# Patient Record
Sex: FEMALE
[De-identification: names, ages, dates, MRNs, and addresses within clinical notes are randomized per-mention and may not be internally consistent; named-entity substitution may affect disease eponyms.]

---

## 2018-11-09 ENCOUNTER — HOSPITAL ENCOUNTER (INPATIENT)
Dept: HOSPITAL 42 - ED | Age: 83
LOS: 11 days | Discharge: HOME | DRG: 675 | End: 2018-11-20
Attending: INTERNAL MEDICINE | Admitting: INTERNAL MEDICINE
Payer: MEDICARE

## 2018-11-09 VITALS — BODY MASS INDEX: 33 KG/M2

## 2018-11-09 DIAGNOSIS — I25.10: ICD-10-CM

## 2018-11-09 DIAGNOSIS — Z90.710: ICD-10-CM

## 2018-11-09 DIAGNOSIS — I70.1: Primary | ICD-10-CM

## 2018-11-09 DIAGNOSIS — Z87.891: ICD-10-CM

## 2018-11-09 DIAGNOSIS — E78.5: ICD-10-CM

## 2018-11-09 DIAGNOSIS — I08.0: ICD-10-CM

## 2018-11-09 DIAGNOSIS — I15.0: ICD-10-CM

## 2018-11-09 DIAGNOSIS — Z79.899: ICD-10-CM

## 2018-11-09 DIAGNOSIS — I16.0: ICD-10-CM

## 2018-11-09 DIAGNOSIS — Z85.42: ICD-10-CM

## 2018-11-09 DIAGNOSIS — Z79.82: ICD-10-CM

## 2018-11-09 DIAGNOSIS — K59.00: ICD-10-CM

## 2018-11-09 LAB
ALBUMIN SERPL-MCNC: 4.7 G/DL (ref 3–4.8)
ALBUMIN/GLOB SERPL: 1.4 {RATIO} (ref 1.1–1.8)
ALT SERPL-CCNC: 19 U/L (ref 7–56)
AST SERPL-CCNC: 29 U/L (ref 14–36)
BASOPHILS # BLD AUTO: 0.02 K/MM3 (ref 0–2)
BASOPHILS NFR BLD: 0.2 % (ref 0–3)
BUN SERPL-MCNC: 24 MG/DL (ref 7–21)
CALCIUM SERPL-MCNC: 10.3 MG/DL (ref 8.4–10.5)
EOSINOPHIL # BLD: 0.3 10*3/UL (ref 0–0.7)
EOSINOPHIL NFR BLD: 2.6 % (ref 1.5–5)
ERYTHROCYTE [DISTWIDTH] IN BLOOD BY AUTOMATED COUNT: 13.5 % (ref 11.5–14.5)
GFR NON-AFRICAN AMERICAN: 53
GRANULOCYTES # BLD: 6.8 10*3/UL (ref 1.4–6.5)
GRANULOCYTES NFR BLD: 56 % (ref 50–68)
HGB BLD-MCNC: 13.1 G/DL (ref 12–16)
LYMPHOCYTES # BLD: 4.2 10*3/UL (ref 1.2–3.4)
LYMPHOCYTES NFR BLD AUTO: 34.3 % (ref 22–35)
MCH RBC QN AUTO: 29.7 PG (ref 25–35)
MCHC RBC AUTO-ENTMCNC: 31.7 G/DL (ref 31–37)
MCV RBC AUTO: 93.7 FL (ref 80–105)
MONOCYTES # BLD AUTO: 0.8 10*3/UL (ref 0.1–0.6)
MONOCYTES NFR BLD: 6.9 % (ref 1–6)
PLATELET # BLD: 251 10^3/UL (ref 120–450)
PMV BLD AUTO: 9.2 FL (ref 7–11)
RBC # BLD AUTO: 4.41 10^6/UL (ref 3.5–6.1)
TROPONIN I SERPL-MCNC: < 0.01 NG/ML
WBC # BLD AUTO: 12.1 10^3/UL (ref 4.5–11)

## 2018-11-09 NOTE — RAD
Date of service: 



11/09/2018



HISTORY:

 palpitations 



COMPARISON:

No prior. 



FINDINGS:



LUNGS:

No active pulmonary disease.



PLEURA:

No significant pleural effusion identified, no pneumothorax apparent.



CARDIOVASCULAR:

 No radiographic findings to suggest acute or significant 

cardiovascular disease.



Atherosclerotic calcifications identified primarily aortic arch.



OSSEOUS STRUCTURES:

No significant abnormalities.



VISUALIZED UPPER ABDOMEN:

Normal.



OTHER FINDINGS:

None.



IMPRESSION:

No active disease. 



___________________________________________________________



Concordant results with the preliminary interpretation rendered by 

the emergency department physician

procedure.

## 2018-11-09 NOTE — ED PDOC
Arrival/HPI





- General


Chief Complaint: High Blood Pressure


Time Seen by Provider: 11/09/18 16:31


Historian: Patient





- History of Present Illness


Narrative History of Present Illness (Text): 





11/09/18 16:40


84 yo female with h/o HTN, Uterine CA presents to the ED c/o palpitations since 

drinking coffee at 1:30 pm today. States that she feels her heart racing. No 

chest pain, no trouble breathing, no headache or dizziness. Patient measured her

bp at home which 220 systolic prompting her to come to emergency room. Patient 

denies any fever, chills,  shortness of breath, diarrhea, nausea, vomiting,  

urinary symptoms, back pain, neck pain, or any other complaints. 





PMD: Dr. Carrasquillo


Cardiologist: Dr. Thao





Time/Duration: Other (earlier today)


Symptom Onset: Gradual


Symptom Course: Unchanged


Activities at Onset: Light


Context: Home





Past Medical History





- Provider Review


Nursing Documentation Reviewed: Yes





- Infectious Disease


Hx of Infectious Diseases: None





- Cardiac


Hx Hypertension: Yes


Hx Pacemaker: No





- Pulmonary


Hx Bronchitis: Yes





- Neurological


Hx Paralysis: No





- HEENT


Hx Cataracts: Yes (OU)





- Endocrine/Metabolic


Other/Comment: hypoglycemic





- Hematological/Oncological


Hx Blood Transfusions: No


Hx Blood Transfusion Reaction: No





- Musculoskeletal/Rheumatological


Hx Musculoskeletal Disorders: Yes





- Genitourinary/Gynecological


Hx Uterine Cancer: Yes





- Psychiatric


Hx Substance Use: No





- Surgical History


Hx Hysterectomy: Yes


Hx Orthopedic Surgery: Yes


Hx Thyroidectomy: Yes (1/2)





- Anesthesia


Hx Anesthesia Reactions: No


Hx Malignant Hyperthermia: No





- Suicidal Assessment


Feels Threatened In Home Enviroment: No





Family/Social History





- Physician Review


Nursing Documentation Reviewed: Yes


Family/Social History: Unknown Family HX


Smoking Status: Former Smoker


Hx Alcohol Use: No


Hx Substance Use: No





Allergies/Home Meds


Allergies/Adverse Reactions: 


Allergies





codeine Allergy (Intermediate, Verified 01/04/17 10:11)


   NAUSEA/VOMITING/VERTIGO


moxifloxacin HCl [From Avelox] Adverse Reaction (Intermediate, Verified 01/04/17

10:11)


   DIFFICULTY WALKING/TREMORS








Home Medications: 


                                    Home Meds











 Medication  Instructions  Recorded  Confirmed


 


Isosorbide Mononitrate ER [Imdur 30 mg PO DAILY 08/14/12 08/14/18





ER]   


 


Verapamil [Verapamil HCl] 180 mg PO DAILY 08/14/12 08/14/18


 


Furosemide [Lasix] 40 mg PO DAILY 06/13/16 08/14/18


 


Valsartan [Diovan] 100 mg PO DAILY 06/13/16 08/14/18


 


Aspirin [Ecotrin] 81 mg PO DAILY 12/19/16 08/14/18


 


Cholecalciferol [Vitamin D] 5,000 unit PO DAILY 12/19/16 08/14/18


 


Ascorbic Acid [Vitamin C 500 mg 500 mg PO QID 08/14/18 





Tab]   


 


Doxycycline Monohydrate [Mondoxyne 100 mg PO BID 08/14/18 





Nl]   


 


Lactobacillus Combination No.8 2 tab PO BID 08/14/18 





[Adult Probiotic]   


 


Omega-3 Fatty Acids/Fish Oil [Fish 1 tab PO DAILY 08/14/18 





Oil 1,000 mg Softgel]   


 


Promethazine/Dextromethorphan 1 tsp PO QID 08/14/18 





[Promethazine-Dm Syrup]   














Review of Systems





- Physician Review


All systems were reviewed & negative as marked: Yes





- Review of Systems


Constitutional: absent: Fevers, Night Sweats


Respiratory: absent: SOB


Cardiovascular: Palpitations.  absent: Chest Pain


Gastrointestinal: absent: Diarrhea, Nausea, Vomiting


Genitourinary Female: absent: Urine Output Changes


Musculoskeletal: absent: Back Pain, Neck Pain


Neurological: absent: Headache, Dizziness





Physical Exam


Vital Signs Reviewed: Yes





Vital Signs











  Temp Pulse Resp BP Pulse Ox


 


 11/09/18 17:33   78  18  183/73 H  99


 


 11/09/18 17:18   106 H   217/78 H 


 


 11/09/18 17:16     217/78 H 


 


 11/09/18 17:07   72  18  230/104 H  98


 


 11/09/18 16:24  98.1 F  88  18  243/89 H  99











Temperature: Afebrile


Blood Pressure: Hypertensive


Pulse: Regular


Respiratory Rate: Normal


Appearance: Positive for: Well-Appearing, Non-Toxic, Comfortable


Pain Distress: None


Mental Status: Positive for: Alert and Oriented X 3





- Systems Exam


Head: Present: Atraumatic, Normocephalic


Pupils: Present: PERRL


Extroacular Muscles: Present: EOMI


Conjunctiva: Present: Normal


Mouth: Present: Moist Mucous Membranes


Neck: Present: Normal Range of Motion


Respiratory/Chest: Present: Clear to Auscultation, Good Air Exchange.  No: 

Respiratory Distress, Accessory Muscle Use


Cardiovascular: Present: Regular Rate and Rhythm, Normal S1, S2.  No: Murmurs


Abdomen: No: Tenderness, Distention, Peritoneal Signs


Back: Present: Normal Inspection


Upper Extremity: Present: Normal Inspection.  No: Cyanosis, Edema


Lower Extremity: Present: Swelling (+right lower leg swelling, no new swelling)


Neurological: Present: GCS=15, CN II-XII Intact, Speech Normal


Skin: Present: Warm, Dry, Normal Color.  No: Rashes


Psychiatric: Present: Alert, Oriented x 3, Normal Insight, Normal Concentration





Medical Decision Making


ED Course and Treatment: 





11/09/18 16:42


Impression: 85 year old female presenting to the emergency room complaining of 

palpitations. 





Plan:


-- EKG


-- Physician Consult: Dr. Thao


-- Chest X-ray 


-- Trandate


-- Reassess and disposition





Prior Visits:


Notes and results from previous visits were reviewed. 





Progress Notes:





11/09/18 17:00


EKG: Ordered, reviewed, and independently interpreted the EKG.


Rate : 66 BPM


Rhythm : NSR


Interpretation : Minimal voltage for LVH, no ST-segment elevations or 

depressions, no T-wave inversions, normal intervals.





11/09/18 18:45


Patient's BP has improved. She is on multiple blood pressure medications and 

needs better control of her BP. She still has palpitations which is making it a 

discomfort in her chest. Discussed case with Dr. Ram who will accept patient

to her service. Dr. Thao was placed on consult. 





- Lab Interpretations


Lab Results: 











                                 11/09/18 16:45 





                                 11/09/18 16:45 





                                   Lab Results





11/09/18 16:45: Sodium Pending, Potassium Pending, Chloride Pending, Carbon 

Dioxide Pending, Anion Gap Pending, BUN 24 H, Creatinine 1.0, Est GFR ( 

Amer) > 60, Est GFR (Non-Af Amer) 53, Random Glucose 93, Calcium 10.3, Magnesium

Pending, Total Bilirubin 0.6, AST Pending, ALT Pending, Alkaline Phosphatase 

Pending, Lactate Dehydrogenase Pending, Total Creatine Kinase 36, Troponin I 

Pending, Total Protein 8.0, Albumin 4.7, Globulin 3.4, Albumin/Globulin Ratio 

1.4


11/09/18 16:45: WBC 12.1 H, RBC 4.41, Hgb 13.1, Hct 41.3, MCV 93.7, MCH 29.7, 

MCHC 31.7, RDW 13.5, Plt Count 251, MPV 9.2, Gran % 56.0, Lymph % (Auto) 34.3, 

Mono % (Auto) 6.9 H, Eos % (Auto) 2.6, Baso % (Auto) 0.2, Gran # 6.80 H, Lymph #

(Auto) 4.2 H, Mono # (Auto) 0.8 H, Eos # (Auto) 0.3, Baso # (Auto) 0.02











- RAD Interpretation


Radiology Orders: 











11/09/18 17:08


CHEST PORTABLE [RAD] Stat 














- Medication Orders


Current Medication Orders: 














Discontinued Medications





Labetalol HCl (Trandate)  20 mg IV STAT STA


   Stop: 11/09/18 17:09


   Last Admin: 11/09/18 17:18  Dose: 20 mg





eMAR Start Stop


 Document     11/09/18 17:18  GMD  (Rec: 11/09/18 17:18  [facility]  [id number])


     Intravenous Solution


      Start Date                                 11/09/18


      Start Time                                 17:18


MAR Pulse and Blood Pressure


 Document     11/09/18 17:18  GMD  (Rec: 11/09/18 17:18  GMD  [id number])


     Pulse


      Pulse Rate (60-90)                         106


     Blood Pressure


      Blood Pressure (100//90)             217/78














- Scribe Statement


The provider has reviewed the documentation as recorded by the Junior Schmidt





All medical record entries made by the Junior were at my direction and 

personally dictated by me. I have reviewed the chart and agree that the record 

accurately reflects my personal performance of the history, physical exam, 

medical decision making, and the department course for this patient. I have also

 personally directed, reviewed, and agree with the discharge instructions and 

disposition.








Disposition/Present on Arrival





- Present on Arrival


Any Indicators Present on Arrival: No


History of DVT/PE: No


History of Uncontrolled Diabetes: No


Urinary Catheter: No


History of Decub. Ulcer: No


History Surgical Site Infection Following: None





- Disposition


Have Diagnosis and Disposition been Completed?: Yes


Diagnosis: 


 Hypertension, Palpitations





Disposition: HOSPITALIZED


Disposition Time: 18:48


Patient Plan: Observation


Condition: FAIR

## 2018-11-10 LAB
ALBUMIN SERPL-MCNC: 3.5 G/DL (ref 3–4.8)
ALBUMIN/GLOB SERPL: 1.2 {RATIO} (ref 1.1–1.8)
ALT SERPL-CCNC: 22 U/L (ref 7–56)
APPEARANCE UR: CLEAR
AST SERPL-CCNC: 22 U/L (ref 14–36)
BACTERIA #/AREA URNS HPF: (no result) /[HPF]
BASOPHILS # BLD AUTO: 0.01 K/MM3 (ref 0–2)
BASOPHILS NFR BLD: 0.1 % (ref 0–3)
BILIRUB UR-MCNC: NEGATIVE MG/DL
BUN SERPL-MCNC: 22 MG/DL (ref 7–21)
CALCIUM SERPL-MCNC: 9.4 MG/DL (ref 8.4–10.5)
COLOR UR: YELLOW
EOSINOPHIL # BLD: 0.2 10*3/UL (ref 0–0.7)
EOSINOPHIL NFR BLD: 2.9 % (ref 1.5–5)
EPI CELLS #/AREA URNS HPF: (no result) /HPF (ref 0–5)
ERYTHROCYTE [DISTWIDTH] IN BLOOD BY AUTOMATED COUNT: 13.5 % (ref 11.5–14.5)
GFR NON-AFRICAN AMERICAN: 47
GLUCOSE UR STRIP-MCNC: NEGATIVE MG/DL
GRANULOCYTES # BLD: 4.87 10*3/UL (ref 1.4–6.5)
GRANULOCYTES NFR BLD: 62 % (ref 50–68)
HGB BLD-MCNC: 11.3 G/DL (ref 12–16)
LEUKOCYTE ESTERASE UR-ACNC: NEGATIVE LEU/UL
LYMPHOCYTES # BLD: 2.1 10*3/UL (ref 1.2–3.4)
LYMPHOCYTES NFR BLD AUTO: 26.7 % (ref 22–35)
MCH RBC QN AUTO: 30.6 PG (ref 25–35)
MCHC RBC AUTO-ENTMCNC: 32.8 G/DL (ref 31–37)
MCV RBC AUTO: 93.2 FL (ref 80–105)
MONOCYTES # BLD AUTO: 0.7 10*3/UL (ref 0.1–0.6)
MONOCYTES NFR BLD: 8.3 % (ref 1–6)
PH UR STRIP: 6.5 [PH] (ref 4.7–8)
PLATELET # BLD: 204 10^3/UL (ref 120–450)
PMV BLD AUTO: 9.1 FL (ref 7–11)
PROT UR STRIP-MCNC: NEGATIVE MG/DL
RBC # BLD AUTO: 3.69 10^6/UL (ref 3.5–6.1)
RBC # UR STRIP: (no result) /UL
RBC #/AREA URNS HPF: (no result) /HPF (ref 0–2)
SP GR UR STRIP: 1.01 (ref 1–1.03)
T4 FREE SERPL-MCNC: 1.16 NG/DL (ref 0.78–2.19)
TROPONIN I SERPL-MCNC: < 0.01 NG/ML
URATE SERPL-MCNC: 6.7 MG/DL (ref 2.5–6.2)
UROBILINOGEN UR STRIP-ACNC: 0.2 E.U./DL
WBC # BLD AUTO: 7.9 10^3/UL (ref 4.5–11)
WBC #/AREA URNS HPF: NEGATIVE /HPF (ref 0–6)

## 2018-11-10 RX ADMIN — METOPROLOL SUCCINATE SCH MG: 50 TABLET, EXTENDED RELEASE ORAL at 21:48

## 2018-11-10 NOTE — HP
DATE OF EXAM:  11/09/2018





HISTORY OF PRESENT ILLNESS:  The patient is 85 years old who came to

emergency room because of not feeling well.  The patient states she has

feeling of palpitations _____ chest discomfort.  She had coffee in the late

afternoon.  After that, she felt that her heart is fluttering; did not have

any chest pain; did not have any shortness of breath.  She denies any

nausea or vomiting.  No history of dizziness.  The patient states she took

her blood pressure at home; it was 220 and she got worried, and so she came

to emergency room for further evaluation.



PAST MEDICAL HISTORY:  Significant for hypertension.  Recently, she was

having some cough and congestion, so she was given some antitussive.



ALLERGIES:  SHE IS ALLERGIC TO CODEINE AND AVELOX.



MEDICATIONS AT HOME:  She is on verapamil 180 daily and valsartan 160

daily.  She is on Lactobacillus a day, isosorbide mononitrate, and she is

on Lasix 40 daily, doxycycline 100 mg twice a day, and aspirin 81 daily.



SOCIAL HISTORY:  She used to be a heavy smoker in the past, but quit. 

Socially drinks.



PHYSICAL EXAMINATION

GENERAL:  She is awake, alert, oriented, able to communicate.

VITAL SIGNS:  She is afebrile, pulse 58, respirations 18, blood pressure

190/71.

LUNGS:  Bilateral fair airflow.  No rhonchi or crackle.

HEART:  S1 and S2 audible.

ABDOMEN:  Soft, nontender.  No rebound.  No guarding.

NEUROLOGICAL:  The patient is awake, alert, oriented, communicative.



LABORATORY EXAMINATION:  WBC is 12.1, hemoglobin 13.1, hematocrit 41.3,

platelets 251,000.  Chemistry:  Sodium 141, potassium 4.1, chloride 104,

CO2 of 27, BUN 24, creatinine 1, blood sugar of 93.  LFTs are within normal

limits.



ASSESSMENT:

1.  Hypertensive urgency.

2.  Uncontrolled hypertension.

3.  Hyperlipidemia.

4.  Leukocytosis, etiology unclear yet.

5.  History of _____.



PLAN:  The patient will be admitted on telemetry.  We will start her on

Norvasc 10 mg daily and put her on metoprolol and follow up her CBC, CMP,

lipid, thyroid profile in a.m.  Out of bed to chair.  We will put her on

heart-healthy diet.







__________________________________________

Pamela Ram MD





DD:  11/09/2018 19:22:42

DT:  11/10/2018 0:02:49

Job # [id number]

## 2018-11-10 NOTE — CON
DATE OF CONSULTATION:  11/10/2018



INDICATIONS:  Palpitations, heart racing, accelerated hypertension.



HISTORY OF PRESENT ILLNESS:  This is an 85-year-old woman who came to the

emergency room yesterday after inadvertently drinking caffeinated coffee with

palpitations, heart racing, and markedly elevated blood pressure with

systolic blood pressure in the 220-240 range.  She was admitted to

telemetry.  She has been given antihypertensive therapy.  Her blood

pressure has improved.  Her symptoms have improved.  There is no chest

pain, orthopnea, PND, syncope, presyncope, lightheadedness, dizziness,

vertigo, edema, claudication, fever, chills, cough, sputum production,

hemoptysis, abdominal pain, nausea, vomiting, diarrhea, constipation, or

melena.



PAST MEDICAL HISTORY:  Notable for hypertension and uterine cancer.  She

has had a remote cardiac catheterization, which was apparently normal in

1998.  There is no history of rheumatic fever, myocardial infarction,

angina, congestive heart failure, diabetes, stroke, TIA or gout.



MEDICATIONS:  Medications at the time of admission include aspirin,

losartan, Imdur, Lasix, verapamil, and vitamin D.



ALLERGIES:  SHE NOTES ALLERGIES TO AVELOX AND CODEINE.



SOCIAL HISTORY:  She lives at home.  She is ambulatory.  She does not smoke

cigarettes.  She does not drink alcohol.



FAMILY HISTORY:  Noncontributory.



REVIEW OF SYSTEMS:  A 10-point review of systems is otherwise unremarkable,

except as noted above.



PHYSICAL EXAMINATION:

GENERAL:  She is a well-developed woman, lying in bed, on telemetry, in no

acute distress.

VITAL SIGNS:  Notable for sinus rhythm to sinus bradycardia, 56-77 beats

per minute, afebrile, last blood pressure 161/65, respirations 18-20, O2

sat 95-97% on room air.

HEENT:  Exam reveals no neck vein distention, thyromegaly, carotid bruits. 

Mucous membranes moist.  Conjunctivae pink.

NECK:  Supple.

LUNGS:  Lung fields clear.

HEART:  Normal first and second heart sounds.

ABDOMEN:  Soft.  Bowel sounds present.  No mass, organomegaly, tenderness,

rebound or guarding.  No CVA tenderness.  No palpable abdominal aortic

aneurysm.

EXTREMITIES:  No cyanosis, clubbing or edema.

NEUROLOGIC:  Awake, alert, and oriented.

PSYCHIATRIC:  Normal as to mood and affect.

SKIN:  Warm and dry.  No rash or cellulitis.



LABORATORY AND IMAGING STUDIES:  EKG demonstrates regular sinus rhythm, LVH

by voltage, poor R-wave progression, nonspecific ST wave changes.  No

change from a prior EKG.  Chest x-ray reveals no active disease.  White

count 12,100, repeat 7900; hemoglobin 11.3; hematocrit 34.4; platelet count

204,000.  Electrolytes, BUN, creatinine, blood sugar, magnesium, LFTs, CK,

two troponins, thyroid tests all normal.



ASSESSMENT AND PLAN:  The patient is an 85-year-old woman with

hypertension, a former smoker, with a history of a negative cath in 1998,

admitted with palpitations after consuming caffeine inadvertently with

markedly elevated blood pressure.



At this time, she is being treated with antihypertensives.  She is getting

Norvasc, metoprolol, losartan, and p.r.n. clonidine.  She got a dose of

labetalol.  She is getting Lasix.  I will order an echocardiogram.  She can

be out of bed.  She should follow a no-added salt diet.  We will titrate

her antihypertensive medications.  Imdur can be discontinued.  Verapamil

can be discontinued replacing it with amlodipine.  I will follow along with you.


I will make additional recommendations based on her clinical course.





__________________________________________

Willie Thao MD





DD:  11/10/2018 8:00:51

DT:  11/10/2018 8:06:01

Job # [id number]



MTDD

## 2018-11-10 NOTE — CARD
--------------- APPROVED REPORT --------------





Date of service: 11/09/2018



EKG Measurement

Heart [id number]

VT 172P32

[id number]

[id number]

[id number]



<Conclusion>

Normal sinus rhythm

Minimal voltage criteria for LVH, may be normal variant

Anterolateral infarct, age undetermined

Abnormal ECG

## 2018-11-11 RX ADMIN — POTASSIUM CHLORIDE SCH MEQ: 20 TABLET, EXTENDED RELEASE ORAL at 08:33

## 2018-11-11 RX ADMIN — METOPROLOL SUCCINATE SCH MG: 50 TABLET, EXTENDED RELEASE ORAL at 22:24

## 2018-11-11 NOTE — CP.PCM.PN
Subjective





- Date & Time of Evaluation


Date of Evaluation: 11/11/18


Time of Evaluation: 07:00





- Subjective


Subjective: 


Stable on 3R She feels OK. BPs still high.





V/S not. RSR/S. Sonny.  180 sys this AM





PE:





Lungs: clear


Cor.: S1S2


Abd.: soft


Ext.: no edema


Neuro.: alert





Labs 11/10 noted.





Echo done: NL LV with LVH.  See report








Objective





- Vital Signs/Intake and Output


Vital Signs (last 24 hours): 


                                        











Temp Pulse Resp BP Pulse Ox


 


 97.9 F   50 L  18   181/68 H  96 


 


 11/11/18 06:00  11/11/18 06:00  11/11/18 06:00  11/11/18 06:00  11/11/18 06:00











- Medications


Medications: 


                               Current Medications





Amlodipine Besylate (Norvasc)  10 mg PO DAILY [facility]


   Last Admin: 11/10/18 09:51 Dose:  10 mg


Aspirin (Aspirin Chewable)  81 mg PO DAILY [facility]


   Last Admin: 11/10/18 09:52 Dose:  81 mg


Clonidine HCl (Catapres)  0.1 mg PO Q6H PRN


   PRN Reason: SBP>150


   Last Admin: 11/11/18 02:23 Dose:  0.1 mg


Furosemide (Lasix)  40 mg PO DAILY [facility]


   Last Admin: 11/10/18 09:52 Dose:  40 mg


Losartan Potassium (Cozaar)  100 mg PO DAILY [facility]


   Last Admin: 11/10/18 09:52 Dose:  100 mg


Metoprolol Succinate (Toprol Xl)  50 mg PO HS [facility]


   Last Admin: 11/10/18 21:48 Dose:  50 mg











- Labs


Labs: 


                                        





                                 11/10/18 06:00 





                                 11/10/18 06:00 











Assessment and Plan





- Assessment and Plan (Free Text)


Assessment: 





Palpitations and heart racing after caffeine


HBP


H/O remote normal cardiac cath.


Uterine cancer


Former Smoker





Plan:





Titrate B meds: Make clonidine 0.1 mg. TID


Continue: amlodipine, Lasix, losartan


OOB/Ambulate


PARDEEP diet

## 2018-11-11 NOTE — CARD
--------------- APPROVED REPORT --------------





Date of service: 11/10/2018



EXAM: Two-dimensional and M-mode echocardiogram with Doppler and 

color Doppler.



Other Information 

Quality : FairRhythm : 



INDICATION

ACC. HBP, PALPS



2D DIMENSIONS 

Left Atrium (2D)4.3   (1.6-4.0cm)IVSd1.3   (0.7-1.1cm)

LVDd4.2   (3.9-5.9cm)PWd1.3   (0.7-1.1cm)

LVDs2.7   (2.5-4.0cm)FS (%) 35.9   %

LVEF (%)65.0   (>50%)



M-Mode DIMENSIONS 

Aortic Root2.80   (2.2-3.7cm)Aortic Cusp Exc.1.90   (1.5-2.0cm)



Aortic Valve

AoV Peak Hieygvdv797.0cm/s



Mitral Valve

MV E Lopvrazx967.0cm/sMV A Awwkizit748.0cm/sE/A ratio0.8



TDI

Lateral E' Peak V4.58cm/sMedial E' Peak V4.87cm/sE/Lateral E'23.8

E/Medial E'22.4



Tricuspid Valve

TR Peak Vqmgtzjs091gc/sRAP GSGEAGUN33ixLcXI Peak Gr.32mmHg

[id number]



 LEFT VENTRICLE 

The left ventricle is normal size. There is mild concentric left 

ventricular hypertrophy. The left ventricular function is normal. The 

left ventricular ejection fraction is within the normal range. There 

is normal LV segmental wall motion.



 RIGHT VENTRICLE 

The right ventricle is normal size.



 ATRIA 

The left atrium is mildly dilated. The right atrium size is normal. 

The interatrial septum is intact with no evidence for an atrial 

septal defect.



 AORTIC VALVE 

The aortic valve is mildly calcified. There is mild aortic 

regurgitation. 



 MITRAL VALVE 

The mitral valve is thickened but opens well. Mitral annular 

calcification is mild to moderate. Mitral regurgitation is trace.



 TRICUSPID VALVE 

The tricuspid valve is normal in structure. There is mild tricuspid 

regurgitation. There is mild pulmonary hypertension.



 PULMONIC VALVE 

The pulmonic valve is not well visualized. There is trace pulmonic 

valvular regurgitation. 



 GREAT VESSELS 

The aortic root is normal in size.



 PERICARDIAL EFFUSION 

There is no pericardial effusion.



<Conclusion>

The left ventricle is normal size.

There is mild concentric left ventricular hypertrophy.

The left ventricular function is normal.

The aortic valve is mildly calcified. Aortic sclerosis.

There is mild aortic regurgitation. 

There is mild tricuspid regurgitation.

There is mild pulmonary hypertension.

## 2018-11-11 NOTE — PN
DATE:  11/11/2018



SUBJECTIVE:  The patient has no complaints of any chest pain.  No shortness

of breath.  She is sitting in a chair, comfortable.



PHYSICAL EXAMINATION:

VITAL SIGNS:  Temperature is 97.9, pulse of 55, blood pressure is 157/65,

respirations 18.

GENERAL:  The patient is lying in bed, flat, comfortable.

HEENT:  No oral lesion.  Anicteric sclerae.  Moist mucosa.

NECK:  No JVD, adenopathy, or thyromegaly.

CARDIOVASCULAR:  S1 and S2, regular.  No murmurs, rubs, or gallops.

LUNGS:  Clear to auscultation bilaterally.  No wheeze, rales, or rhonchi.

ABDOMEN:  Bowel sounds are positive, soft, nontender and nondistended.

EXTREMITIES:  No cyanosis, clubbing or edema.



LABORATORY DATA:  White count is 7.9, hemoglobin 11.3.  Creatinine is 1.1.



ASSESSMENT:

1.  _____.

2.  Uterine cancer.

3.  Hypertensive urgency.

4.  Dyslipidemia.



PLAN:  The patient's blood pressure is elevated today.  She was seen by Dr. Thao, he has added clonidine 3 times a day for her hypertension.  She is

on an aspirin daily.  She is going to continue with losartan and amlodipine

for her hypertension.  If her blood pressure butt not improve, she may need

hydrochlorothiazide.  The patient is on a heart-healthy diet.







__________________________________________

Dilip Joyce MD





DD:  11/11/2018 10:46:54

DT:  11/11/2018 14:04:19

Job # [id number]

## 2018-11-12 RX ADMIN — METOPROLOL SUCCINATE SCH: 50 TABLET, EXTENDED RELEASE ORAL at 21:26

## 2018-11-12 RX ADMIN — POLYETHYLENE GLYCOL 3350 SCH GM: 17 POWDER, FOR SOLUTION ORAL at 10:31

## 2018-11-12 RX ADMIN — BACITRACIN SCH APPLIC: 500 OINTMENT TOPICAL at 17:24

## 2018-11-12 RX ADMIN — POTASSIUM CHLORIDE SCH MEQ: 20 TABLET, EXTENDED RELEASE ORAL at 10:30

## 2018-11-12 NOTE — PN
DATE:  11/12/2018



SUBJECTIVE:  The patient is 85 years old, seen and examined sitting in

chair.  Denies any nausea or vomiting.  No history of dizziness.  She is

nervous going home because she still has fluctuating blood pressure.



PHYSICAL EXAMINATION:

VITAL SIGNS:  She is afebrile, pulse 54, respirations 19, blood pressure

159/58.

LUNGS:  Bilateral fair airflow.  No rhonchi or crackle.

HEART:  S1 and S2 audible.

ABDOMEN:  Soft, nontender.  No rebound.  No guarding.

NEUROLOGICAL:  The patient is awake, alert, oriented, communicative,

ambulatory.



ASSESSMENT:

1.  Uncontrolled hypertension.

2.  Palpitation.

3.  History of uterine cancer.

4.  Renal insufficiency.

5.  Hyperlipidemia.



PLAN:  The patient is currently on amlodipine 10 mg.  We will increase

clonidine 0.2 three times a day and metoprolol cannot be increased since

she is running bradycardic.  We will follow up the patient in the a.m.





__________________________________________

Pamela Ram MD



DD:  11/12/2018 21:51:38

DT:  11/12/2018 22:04:07

Job # [id number]

## 2018-11-12 NOTE — PN
DATE:  11/10/2018



SUBJECTIVE:  The patient is 85 years old, seen and examined, lying in bed. 

Seems to be comfortable.  She states she is just feeling some heat in her

face.  Otherwise, doing well.  No nausea or vomiting.  No diarrhea.



PHYSICAL EXAMINATION:

VITAL SIGNS:  She is afebrile, pulse 55, respirations 20, blood pressure

157/67.

LUNGS:  Bilateral fair airflow.  No rhonchi or crackle.

HEART:  S1 and S2 audible.

ABDOMEN:  Soft.  Nontender.  No rebound.  No guarding.

NEUROLOGICAL:  The patient is awake, alert, oriented, communicative.



LABORATORY EXAM:  WBC 7.9, hemoglobin 11.3, hematocrit 34, platelet 204. 

Chemistry:  Sodium 141, potassium 3.6, chloride 107, CO2 of 28, BUN 22,

creatinine 1.1, blood sugar _____.



ASSESSMENT:

1.  Uncontrolled hypertension.

2.  Status post palpitation.

3.  History of uterine cancer.

4.  History of multiple orthopedic surgeries in the past.



PLAN:  We will continue the patient on aspirin 81 daily.  She is on

clonidine as needed.  We will continue her on losartan.  She is on Lasix. 

We will continue her on amlodipine and metoprolol can be given at bedtime. 

We will monitor the patient's blood pressure for 24 hours.  If her blood

pressure is stable, possible discharge in the a.m.





__________________________________________

Pamela Ram MD





DD:  11/10/2018 11:32:58

DT:  11/10/2018 11:36:23

Job # [id number]

## 2018-11-12 NOTE — CP.PCM.PN
Subjective





- Date & Time of Evaluation


Date of Evaluation: 11/12/18


Time of Evaluation: 07:00





- Subjective


Subjective: 





Stable on 3R She feels OK. BPs better during the day but still elevated during 

the night and this AM. HR in 40's.





V/S not. RSR/S. Sonny.  180 sys this AM





PE:





Lungs: clear


Cor.: S1S2


Abd.: soft


Ext.: no edema


Neuro.: alert





I/O = 760/350 recorded.





Labs 11/10 noted.





Echo done: NL LV with LVH.  See report











Objective





- Vital Signs/Intake and Output


Vital Signs (last 24 hours): 


                                        











Temp Pulse Resp BP Pulse Ox


 


 97.9 F   43 L  18   186/70 H  98 


 


 11/12/18 06:00  11/12/18 06:00  11/12/18 06:00  11/12/18 06:00  11/12/18 06:00








Intake and Output: 


                                        











 11/12/18 11/12/18





 06:59 18:59


 


Intake Total 480 


 


Balance 480 














- Medications


Medications: 


                               Current Medications





Amlodipine Besylate (Norvasc)  10 mg PO DAILY [facility]


   Last Admin: 11/11/18 09:04 Dose:  10 mg


Aspirin (Aspirin Chewable)  81 mg PO DAILY [facility]


   Last Admin: 11/11/18 09:04 Dose:  81 mg


Chlorthalidone (Hygroton)  25 mg PO DAILY [facility]


Clonidine HCl (Catapres)  0.2 mg PO BID [facility]


Losartan Potassium (Cozaar)  100 mg PO DAILY [facility]


   Last Admin: 11/11/18 09:05 Dose:  100 mg


Metoprolol Succinate (Toprol Xl)  50 mg PO HS [facility]


   Last Admin: 11/11/18 22:24 Dose:  50 mg


Polyethylene Glycol (Miralax)  17 gm PO DAILY [facility]


Potassium Chloride (K-Dur 20 Meq Er Tab)  20 meq PO BRK [facility]


   Last Admin: 11/11/18 08:33 Dose:  20 meq











- Labs


Labs: 


                                        





                                 11/10/18 06:00 





                                 11/10/18 06:00 











Assessment and Plan





- Assessment and Plan (Free Text)


Assessment: 





Palpitations and heart racing after caffeine


HBP


H/O remote normal cardiac cath.


Uterine cancer


Former Smoker





Plan:





Titrate meds: Make clonidine 0.2 mg. BID


Change Lasix to Hygroton 25/day


Continue: amlodipine, losartan


OOB/Ambulate


PARDEEP diet


Home soon with close out-pt f/u.

## 2018-11-13 RX ADMIN — BACITRACIN SCH APPLIC: 500 OINTMENT TOPICAL at 18:02

## 2018-11-13 RX ADMIN — POLYETHYLENE GLYCOL 3350 SCH GM: 17 POWDER, FOR SOLUTION ORAL at 09:24

## 2018-11-13 RX ADMIN — METOPROLOL SUCCINATE SCH MG: 50 TABLET, EXTENDED RELEASE ORAL at 09:22

## 2018-11-13 RX ADMIN — BACITRACIN SCH APPLIC: 500 OINTMENT TOPICAL at 09:24

## 2018-11-13 RX ADMIN — POTASSIUM CHLORIDE SCH MEQ: 20 TABLET, EXTENDED RELEASE ORAL at 09:23

## 2018-11-13 NOTE — US
PROCEDURE:  Bilateral renal artery duplex ultrasound.



CLINICAL HISTORY:  Renal artery stenosis. Uncontrolled hypertension. 

Evaluate for renovascular hypertension.



PHYSICIAN(S):  Zia Gordon M.D.



TECHNIQUE:

Duplex sonography with color-flow Doppler was used to evaluate the 

visualized segments of the main renal arteries. The patient was 

evaluated in a fasting state. Imaging in a supine and decubitus 

position was performed. Limited evaluation of the arcuate waveforms 

and resistive indices were performed.



FINDINGS:

The exam is limited.  The kidneys are normal in size, shape, and 

location. The right kidney measures 9.9cm in length and the left 

kidney measures 9.0cm in length. No solid renal masses, abnormal 

calcifications, or hydronephrosis is seen. 



The right main renal artery is tortuous in seen in segment.  The peak 

systolic velocity in the right main renal artery is 425 cm/sec. This 

is consistent with a 50-99 percentstenosis in the main right renal 

artery. The arcuate waveforms are normal. The resistive index is 

normal.



The left main renal artery is also only visualized in segments..  The 

peak systolic velocity in the main left renal artery is 132cm/sec.  

This corresponds to a 0 to 49% stenosis in the main left renal 

artery. The arcuate waveforms and resistive indices are normal.



IMPRESSION:

1. The main renal arteries are tortuous and only seen in segments. 



2.  Elevated velocities up to 425 cm/second are noted in the proximal 

right renal artery.  This is consistent with a 50-99 percent proximal 

stenosis.  If clinically indicated, this can be confirmed with CTA, 

MRA, or conventional arteriography 



3.  0-49 percent left renal artery stenosis

## 2018-11-13 NOTE — US
Date of service: 



11/13/2018



PROCEDURE:  Test test test ultrasound of the Kidneys



HISTORY:

HTN



COMPARISON:

Abdomen pelvis CT without contrast 05/28/2016..



TECHNIQUE:

Sonogram of the kidneys.



FINDINGS:



RIGHT KIDNEY:

Measures: 10.2 x 3.4 x 5.6 cm. 



Normal in size, contour and echogenicity. 



No stone, solid mass lesion or hydronephrosis visualized. 



LEFT KIDNEY:

Measures: 9.3 x 3.3 x 4.1 cm. 



Normal in size, contour and echogenicity. 



No stone, solid mass lesion or hydronephrosis visualized. 



OTHER FINDINGS:

None.



IMPRESSION:

Unremarkable renal sonogram.

## 2018-11-13 NOTE — CON
DATE:  2018



REFERRING PHYSICIAN:  Pamela Ram MD.



PATIENT'S FAMILY PHYSICIAN:  Yajaira Carrasquillo DO.



REASON FOR CONSULTATION:  Evaluation of a patient unknown to me, who

presents with uncontrolled hypertension.



HISTORY OF PRESENT ILLNESS:  The patient is an 85-year-old white female

with a history of hypertension of 35+ years' duration.  The patient states

in recent time she has had more difficulty controlling her blood pressure. 

This coincided with discontinuation of generic Diovan and substitution with

losartan.  The patient had been on verapamil at home, Isordil.  The patient

states that when her blood pressure is elevated she is mildly symptomatic,

she is dizzy, but no headaches.  She does get occasional palpitations.  The

patient presented to the hospital with uncontrolled hypertension with

systolics in the 180+ range.  Diastolic blood pressures were normal.  The

patient is currently en route to have a renal artery Doppler study and

ultrasound.  Her BUN and creatinine have remained normal.  She has no

protein in her urine.  The patient had been seen by Cardiology, Dr. Willie Thao.  She has a history of nonobstructive coronary artery disease with

negative cardiac catheterizations done many years ago.  The patient also

has a history of uterine cancer with mild edema of her right lower

extremity secondary to lymph node dissection.  No history of diabetes.  She

is also status post hemithyroidectomy for removal of a benign tumor.  We

are asked to evaluate the patient for her elevated blood pressure.



PAST MEDICAL HISTORY:  Significant for hypertension of 35+ years' duration,

history of hyperlipidemia, history of uterine cancer, history of mild

anemia, history of having thyroidectomy for removal of benign tumor,

history of nonobstructive coronary artery disease.



MEDICATIONS AT HOME:  Include that of verapamil, vitamin D, Isordil, Lasix,

losartan, and aspirin.



ALLERGIES:  THE PATIENT IS ALLERGIC TO CODEINE AND MOXIFLOXACIN.



MEDICATIONS:  Currently in the hospital include that of aspirin,

bacitracin, clonidine 0.2 mg three times a day, losartan 100 mg a day,

Hygroton, K tabs, MiraLax, Norvasc 10 mg a day and Toprol 25 mg a day.



SOCIAL HISTORY:  Past history of cigarette smoking, she quit in .  No

history of alcohol use.



FAMILY HISTORY:  Father  of complications of stroke.  Mother  of

complications of diabetes, stroke, heart disease.  Sister has hypertension.



REVIEW OF SYSTEMS:

GENERAL:  The patient states her appetite and weight have been stable.

ENT:  Denies any hearing or visual problems.

PULMONARY:  No shortness of breath.  No asthma or bronchitis.  No

emphysema.  No pneumonia.

CARDIAC:  No chest pain.  Positive palpitations.  History of cardiac

catheterization many years ago with negative findings.

GASTROINTESTINAL:  No nausea or vomiting.  No diarrhea.  No constipation. 

She had no abdominal pain.

GENITOURINARY:  No history of chronic kidney disease.  No history of UTIs.

GYNECOLOGIC:  Postmenopausal.

ENDOCRINE:  No history of diabetes.

MUSCULOSKELETAL:  No complaints.

NEURO:  No history of CVA, TIA, seizures, or syncope.

HEME/ONC:  History of uterine cancer many years ago.  Heme positive for

mild anemia.

PSYCHIATRIC:  History is negative.



PHYSICAL EXAMINATION:

GENERAL:  The patient is currently seen on a stretcher en route to having

her renal artery Doppler study and ultrasound.  She appears to be in no

acute distress.

VITAL SIGNS:  Blood pressure 163/62, pulse of 60, respiratory rate of 20,

temperature 98.1.

HEENT:  Exam shows her to be normocephalic, atraumatic.  Conjunctivae are

pink.  Sclerae are nonicteric.  Pupils equal and reactive to light and

accommodation.  Her extraocular muscles are intact.  Posterior pharynx is

normal.

NECK:  Supple.  No neck vein distention.  No lymphadenopathy.  Healed

thyroid scar.  No bruits.

CHEST:  Clear to auscultation and percussion.  No rales, rhonchi or

wheezing.

CARDIOVASCULAR:  Shows a regular rate and rhythm.  No S3, no S4, no rub. 

Positive MR/AI.

ABDOMEN:  Soft.  Bowel sounds normal.  No abdominal bruits.  No masses.  No

guarding.

BACK:  No CVAT.  No spinal tenderness.

EXTREMITIES:  No cyanosis or clubbing.  Puffy right lower extremity with no

pitting edema.  Left lower extremity is normal.

NEUROLOGIC:  Shows her be alert, oriented x3 with no gross focal motor or

sensory deficits noted.



IMAGING STUDIES:  Admitting EKG showed a normal sinus rhythm with minimal

voltage criteria for LVH.  Heart rate was 66.  Echocardiogram was done

showed concentric LVH, ejection fraction 65%, MR/AI.  Renal artery Doppler

study and ultrasound are being done at the time of this dictation.



LABORATORY DATA:  CBC, white blood cell count initially 12.1, down to 7.9. 

Hemoglobin is 11.3, down from 13.1.  Platelet count is normal at _____ now

down to 204,000.  Chemistries showed normal electrolytes.  BUN is 22 with a

creatinine of 1.1.  Hemoglobin A1c is 5.4%.  Uric acid is 6.7.  Calcium

9.4, magnesium 2.1.  Liver enzymes are normal.  Albumin is 4.7, down to

3.5.  Thyroid function tests are normal.  Urine showed no protein.  No

blood.



ASSESSMENT:

1.  Uncontrolled hypertension.  The patient has uncontrolled systolic

hypertension only.  Diastolic readings are normal.  The patient is on

combination therapy with clonidine, calcium channel blocker therapy,

angiotensin receptor blocker therapy, and low-dose beta-blocker therapy. 

Her systolic blood pressures remain in the 160-200 range.  It is noteworthy

that the patient had difficulty in controlling her blood pressure at or

about the time that generic Diovan was discontinued from her regimen.  This

was done because of the recall.  The patient would do much better with a

longer-acting more potent angiotensin receptor blocker.  Perhaps generic

Benicar or Edarbi.  These medications are unavailable in the hospital

formulary.  Agree with ruling out renal artery stenosis given her

uncontrolled hypertension.  However, with no protein in the urine and

normal renal parameters, this would probably be an unlikely finding. 

Unlikely that the patient has any secondary hypertension from adrenal

means, but this can also be checked at a later point in time.  We can do a

screening, plasma normetanephrine level.  For right now, I would continue

the patient on current medical therapy.  I would like to ideally switch her

to a longer-acting more potent angiotensin receptor blocker, but I did not

believe this is available on the formulary.  I do not want to put her back

on generic Diovan because this was discontinued during the summertime

during the time of the recall.

2.  Concentric LVH.  This is secondary to years of hypertension.

3.  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

4.  History of uterine cancer, currently stable.

5.  History of mild anemia.  Hemoglobin is slightly lower.  No workup

necessary at this point in time.

6.  Status post resection of a benign thyroid tumor with normal thyroid

function tests.  This appears to be stable.



PLAN:

1.  Agree with Doppler study and renal ultrasound.

2.  Continue present medication with close monitoring of her hypertension.

3.  Would attempt to try and obtain at least generic Benicar 40 mg a day in

place of losartan, I am not certain this is possible as it is not on

formulary.

4.  The patient does have some evidence for mild target organ disease with

concentric LVH.

5.  Discussed with the patient the definite need for outpatient followup. 

It appears that she is going to be going to the TCU for 1 week.  Once she

gets out, she may come to our office, and we could measure different

parameters relating to her blood pressure, a BioZ study.  The same as her

systemic vascular resistance, thoracic fluid content, stroke volume and

cardiac output.  It will help us better gear her blood pressure medication.



We will follow the patient on 3R and again in the TCU, and she is here for

the week.  Perhaps it is possible to get a prescription for generic Benicar

for her once she gets in the TCU.



Thank you for letting us partake and share in the care of your patient.







__________________________________________

Boni Davis MD





DD:  2018 14:01:33

DT:  2018 14:10:27

Job # [id number]

## 2018-11-13 NOTE — PN
DATE:  11/13/2018



SUBJECTIVE:  The patient is an 85 years old, seen and examined, very

anxious about her blood pressure fluctuation.  The patient states she was

doing okay up until 2 weeks; since then, she is having fluctuating blood

pressures.  Daughter was by the bedside, is very concerned.  I had a long

discussion with the patient and patient's daughter.  They want further

workup.  We will get nephrologist involved.  On her current regimen, blood

pressure does not seem to be under control.



PHYSICAL EXAMINATION:

GENERAL:  On examination today, she is awake, alert, oriented,

communicative.

VITAL SIGNS:  She is afebrile, pulse 52, respirations 19, blood pressure

140/59.

LUNGS:  Bilateral fair airflow.  No rhonchi or crackle.

HEART:  S1 and S2 audible.

ABDOMEN:  Soft, nontender.  No rebound.  No guarding.

NEUROLOGICAL:  Patient is awake, alert, oriented, and communicative.



LABORATORY EXAM:  Renal ultrasound is unremarkable, and renal Doppler study

shows elevated velocity up to 425 cm/sec, noted in the proximal right renal

artery, consistent with 50% to 99% proximal stenosis, and need further

workup.



ASSESSMENT:

1.  Fluctuating hypertension.

2.  Hyperlipidemia.

3.  Renal artery stenosis.



PLAN:  Awaiting Nephrology input.  I will order for MRA or CT scan after

consulting with Dr. Zia Gordon.







__________________________________________

Pamela Ram MD





DD:  11/13/2018 19:35:28

DT:  11/13/2018 20:16:10

Job # [id number]

## 2018-11-13 NOTE — CP.PCM.PN
Subjective





- Date & Time of Evaluation


Date of Evaluation: 11/13/18


Time of Evaluation: 07:00





- Subjective


Subjective: 





Stable on 3R She feels OK. BPs better during the day but still elevated during 

the night and this AM. HR in 40's. Metop. was held last night.





V/S not. RSR/S. Sonny 40s.  180 sys this AM.  154/59 - 184/72 yesterday





PE:





Lungs: clear


Cor.: S1S2


Abd.: soft


Ext.: no edema


Neuro.: alert








Labs 11/10 noted.





Echo done: NL LV with LVH.  See report








Objective





- Vital Signs/Intake and Output


Vital Signs (last 24 hours): 


                                        











Temp Pulse Resp BP Pulse Ox


 


 98.1 F   51 L  20   184/72 H  100 


 


 11/13/18 06:00  11/13/18 06:19  11/13/18 06:00  11/13/18 06:19  11/13/18 06:00








Intake and Output: 


                                        











 11/13/18 11/13/18





 06:59 18:59


 


Intake Total 180 


 


Balance 180 














- Medications


Medications: 


                               Current Medications





Amlodipine Besylate (Norvasc)  10 mg PO DAILY [facility]


   Last Admin: 11/13/18 05:28 Dose:  10 mg


Aspirin (Aspirin Chewable)  81 mg PO DAILY [facility]


   Last Admin: 11/12/18 10:31 Dose:  81 mg


Bacitracin (Bacitracin)  1 gm TOP BID [facility]


   Last Admin: 11/12/18 17:24 Dose:  1 applic


Chlorthalidone (Hygroton)  25 mg PO DAILY [facility]


   Last Admin: 11/12/18 10:31 Dose:  25 mg


Clonidine HCl (Catapres)  0.2 mg PO TID [facility]


Losartan Potassium (Cozaar)  100 mg PO DAILY [facility]


   Last Admin: 11/13/18 05:29 Dose:  100 mg


Metoprolol Succinate (Toprol Xl)  25 mg PO DAILY [facility]


Polyethylene Glycol (Miralax)  17 gm PO DAILY [facility]


   Last Admin: 11/12/18 10:31 Dose:  17 gm


Potassium Chloride (K-Dur 20 Meq Er Tab)  20 meq PO BRK [facility]placed [facility]


   Last Admin: 11/12/18 10:30 Dose:  20 meq











- Labs


Labs: 


                                        





                                 11/10/18 06:00 





                                 11/10/18 06:00 











Assessment and Plan





- Assessment and Plan (Free Text)


Assessment: 





Palpitations and heart racing after caffeine


HBP


H/O remote normal cardiac cath.


Uterine cancer


Former Smoker





Plan:





Continue clonidine 0.2 mg. TID


Continue Hygroton 25/day


Continue: amlodipine 10, losartan 100


Reduce metoprolol ER 25/day


OOB/Ambulate


PARDEEP diet


Home soon with close out-pt f/u and Hypertension Consultation.

## 2018-11-14 PROCEDURE — 047A3DZ DILATION OF LEFT RENAL ARTERY WITH INTRALUMINAL DEVICE, PERCUTANEOUS APPROACH: ICD-10-PCS | Performed by: RADIOLOGY

## 2018-11-14 PROCEDURE — B4181ZZ FLUOROSCOPY OF BILATERAL RENAL ARTERIES USING LOW OSMOLAR CONTRAST: ICD-10-PCS | Performed by: RADIOLOGY

## 2018-11-14 PROCEDURE — 04793DZ DILATION OF RIGHT RENAL ARTERY WITH INTRALUMINAL DEVICE, PERCUTANEOUS APPROACH: ICD-10-PCS | Performed by: RADIOLOGY

## 2018-11-14 RX ADMIN — POLYETHYLENE GLYCOL 3350 SCH GM: 17 POWDER, FOR SOLUTION ORAL at 10:09

## 2018-11-14 RX ADMIN — BACITRACIN SCH APPLIC: 500 OINTMENT TOPICAL at 10:07

## 2018-11-14 RX ADMIN — POTASSIUM CHLORIDE SCH MEQ: 20 TABLET, EXTENDED RELEASE ORAL at 08:00

## 2018-11-14 RX ADMIN — METOPROLOL SUCCINATE SCH MG: 50 TABLET, EXTENDED RELEASE ORAL at 08:01

## 2018-11-14 RX ADMIN — METOPROLOL SUCCINATE SCH: 50 TABLET, EXTENDED RELEASE ORAL at 10:09

## 2018-11-14 NOTE — PN
DATE:  11/14/2018



SUBJECTIVE:  The patient is 85 years old, seen and examined, just came back

from MRI while having no stenosis.  The patient's blood pressure seemed to

be fluctuating this morning around 8 o'clock.  Her blood pressure was

171/75.  She was given her usual medications and seems to be doing well

now.  She was found to be bradycardic.



PHYSICAL EXAMINATION:

GENERAL:  On examination today, she is awake, alert, oriented and

communicative.

VITAL SIGNS:  The patient is afebrile, pulse 56, respiration 20 and blood

pressure 171/75.

LUNGS:  Bilateral fair airflow.  No rhonchi or crackles.

HEART:  S1 and S2, audible.

ABDOMEN:  Soft and nontender.  No rebound.  No guarding.

NEUROLOGIC:  The patient is awake, alert and able to communicate.



LABORATORY DATA:  The patient has renal artery duplex done that shows

possible right renal artery stenosis.  The patient had MRA done as

recommended by Dr. Zia Gordon, we will follow that.  She might need

angioplasty if this is significant renal stenosis.



ASSESSMENT:

1.  Uncontrolled hypertension.

2.  Right renal artery stenosis.

3.  Hypertension.

4.  Constipation.



PLAN:  We will continue patient on aspirin 81 mg daily.  She is on

clonidine 0.2 three times a day.  She is on losartan 100 daily.  She is on

chlorthalidone 25 daily.  She is on metoprolol 25 daily and Norvasc 10 mg

daily.  We will follow up MRA and we will discuss with Dr. Zia Gordon

about it.







__________________________________________

Pamela Ram MD





DD:  11/14/2018 11:40:15

DT:  11/14/2018 12:05:47

Job # [id number]

## 2018-11-14 NOTE — VASCULAR
Date of service: 



11/14/2018



PROCEDURE:  1.  Selective bilateral renal arteriograms. 



2.  Left renal artery origin angioplasty and stent placement 



3.  Right renal artery origin angioplasty and stent placement 







HISTORY:

Uncontrolled hypertension.  Right renal artery stenosis on duplex 

arterial ultrasound.  Evaluate for bilateral renal artery stenosis. 



PHYSICIAN(S):  Zia Gordon M.D.







TECHNIQUE:

The relative risks and indications of the procedure were explained to 

the patient and her family and consent obtained. The patient was 

hydrated prior to the procedure and the appropriate labs drawn. The 

patient was placed supine on the arteriogram table and the right 

groin prepped and draped in the usual sterile fashion. Conscious 

sedation and monitoring were provided throughout the procedure by a 

nurse. 



Under ultrasound guidance, the right common femoral artery was 

punctured with a micropuncture set.  Exchange was made for a 5 Fijian 

sheath.  Over a wire 5 Fijian flush catheter was placed the abdominal 

aorta at the level of the renal arteries and a slight SEAN DSA 

abdominal and pelvic arteriogram performed. 



Exchange is made for an 8 Fijian hockey stick guide catheter placed 

the origin of the left renal artery.  A selective left renal 

arteriogram was performed.  The calcified eccentric plaque was 

crossed with a 0.014 support wire.  The left renal artery origin was 

dilated with a 6 mm balloon.  Next a 6.5 mm x 15 mm balloon 

expandable stent was deployed the ostium of the left renal artery.  

An excellent angiographic result was obtained 



The 8 Fijian hockey stick guide catheter was placed like doubly in 

the origin of the right renal artery.  A selective DSA right renal 

arteriogram was performed.  The focal stenosis at the origin was 

crossed with a 0.014 support wire.  The proximal right renal artery 

was dilated with a 6 mm balloon.  Next a 6 mm x 15 mm balloon 

expandable stent was deployed the ostium of the right renal artery.  

Completion angiograms were obtained.  The sheath was removed 

hemostasis obtained with a Perclose device.  The patient tolerated 

the procedure well. 



FINDINGS:

Diffuse vascular calcification is noted.  There are single renal 

arteries bilaterally.  There is a critical calcified polypoid 

stenosis at the origin of the left renal artery.  A 70 percent focal 

stenosis is seen at the origin of the right renal artery.  Localized 

FMD is noted in the distal main right renal artery.  The nephrograms 

are symmetric in size and appearance. 



IMPRESSION:

1.Severe greater than 90 percent bilateral renal artery stenoses. 



2.  Successful bilateral renal artery origin angioplasty and stent 

placement 



3.  Localized FMD at the right renal bifurcation

## 2018-11-14 NOTE — PN
DATE:  11/14/2018



SUBJECTIVE:  The patient is seen sitting in chair.  She is awake.  She is

alert.  She is comfortable.  She reports that her pressure is better

controlled.  She had an MRA this morning.



PHYSICAL EXAMINATION:

GENERAL:  Elderly lady, sitting in chair.

VITAL SIGNS:  Blood pressure 128/65, heart rate 50, respiratory rate 20,

temperature 97.4.

HEENT:  Normocephalic, atraumatic, positive pallor.

NECK:  Supple, no JVD.

LUNGS:  Bilateral equal air entry, bilateral equal expansion, no rales.

CARDIAC:  S1 and S2, regular rate and rhythm, no murmur, no rub.

ABDOMEN:  Obese, distended, soft, nontender, bowel sounds present.

EXTREMITIES:  Trace lower extremity edema.

INTAKE AND OUTPUT:  Not charted.



LABORATORY DATA:  WBC 7.9, hemoglobin 11, hematocrit 34, platelets 204. 

Sodium 141, potassium 3.6, chloride 107, CO2 of 28, BUN 22, creatinine 1.1,

glucose 95, A1c 5.4, uric acid 6.7, calcium 9.4, magnesium 2.1. 

Urinalysis:  Yellow, clear, pH 6.5, specific gravity 1.01, protein

negative, glucose negative, ketones negative, blood trace intact.



Renal ultrasound:  Right kidney 10 cm, left kidney 9.3 cm.  Renal artery

Dopplers:  Right renal artery tortuous.  The peak velocity is 425

consistent with 50%-99% stenosis.  Left peak velocity is 132.  MRA showed

high-grade stenosis of the left renal artery and moderate-to-severe

stenosis in the right renal artery.



CURRENT MEDICATIONS:  Aspirin, bacitracin, clonidine 0.2 t.i.d., Cozaar

100, chlorthalidone 25, potassium 20 mEq, amlodipine 10, Plavix 75, half

normal saline at 125, and Toprol-XL.



ASSESSMENT:

1.  Labile blood pressure.  The results of the renal artery Dopplers and

MRA are not congruent, it was discussed with Dr. Zia Gordon.

2.  Mild left ventricular hypertrophy.

3.  _____.

4.  Advanced age.



PLAN:

1.  We will discuss results of renal artery Dopplers and MRA with Dr. Zia Gordon.

2.  Blood pressure seems to be adequately controlled right now.

3.  I would favor conservative management in this elderly lady if there is

no significant renal artery stenosis.







__________________________________________

Katiuska Alfred MD



DD:  11/14/2018 15:32:36

DT:  11/14/2018 15:56:04

[facility] # [id number]

## 2018-11-14 NOTE — MRI
Date of service: 



11/14/2018



PROCEDURE:  MR angiography of the abdomen without contrast



HISTORY:

uncontrolled HTN.  rt NIKO on US



COMPARISON:

Doppler ultrasound 11/13/2018



TECHNIQUE:

MR angiography of the abdomen was performed without IV contrast using 

time of flight techniques.



FINDINGS:

There is a high-grade stenosis of the left renal artery at its 

origin.  There is also a moderate to severe stenosis of the right 

renal artery origin.  The findings are best seen on series 9 images 

57 through 60.



The SMA and celiac arteries are widely patent.  The appear to share a 

common origin.



IMPRESSION:

Bilateral renal artery stenosis.  See comments

## 2018-11-14 NOTE — CON
DATE:  11/14/2018



CHIEF COMPLAINT:  This is an 85-year-old who was admitted with uncontrolled

hypertension and palpitations.  She states her blood pressure on admission

was 245/138.  She has had longstanding hypertension.  It has been difficult

to control for the past year.  Her duplex renal ultrasound demonstrates

elevated velocities in the proximal right renal artery.  I reviewed the

study, there is tortuosity and the images are suboptimal.  The patient has

no history of renal insufficiency.



PAST MEDICAL HISTORY:  Her past medical history is significant for

hypertension, dyslipidemia, uterine cancer and previous thyroidectomy.



RECOMMENDATIONS:  I had a pleasant conversation with the patient and

explained her situation.  I have ordered an MRA of the renal arteries to

confirm the duplex ultrasound findings.  If the MRA is consistent with a

right renal artery stenosis, she may benefit from revascularization given

her clinical presentation.  I reviewed the situation with her and will

speak to the daughter after the MRA results are back.







__________________________________________

Zia Gordon MD





DD:  11/14/2018 10:18:41

DT:  11/14/2018 10:21:29

Job # [id number]



MTDD

## 2018-11-14 NOTE — CP.CCUPN
CCU Subjective





- Physician Review


Subjective (Free Text): 





CRITICAL CARE CONSULT NOTE FOR DR. LYRIC Szymanski PGY-1





86 y/o F with PMHx of HTN for 35+ years, hx of uterine cancer presented to [facility] 

on 11/9 with complaints of palpitations and chest discomfort, fluttering without

chest pain, sob, nausea, vomiting after drinking caffeinated coffee admitted to 

[facility] for hypertensive urgency with BPs in the 180s systolic. She reports checking

her BP at home, and noticed difficulty controlling HTN after discontinuing 

valsartan and starting losartan. She was admitted to the telemetry floor and 

given antihypertensive therapy including amlodipine 10mg, chlorthalidone 25mg, 

clonidine 0.2mg, losartan 100mg, metoprolol succinate 25mg. She was evaluated 

for renovascular hypertension with renal duplex revealing tortuous main renal 

arteries with elevated velocities in the R side and bilateral renal artery 

stenosis on MRA. Pt is s/p revascularization of renal arteries with b/l renal 

stent placement.





Upon interview: She denies any acute complaints. 12 point ROS is negative. 





PMHx: HTN, Hx of uterine cancer,


PSH: thyroid tumor resection


All: codeine, moxifloxacin, 


FH: noncontributiory


SH: Denies tobacco/alcohol











CCU Objective





- Vital Signs / Intake & Output


Intake and Output (Last 8hrs): 


                                 Intake & Output











 11/14/18 11/14/18 11/14/18





 06:59 14:59 22:59


 


Other:   


 


  # Voids   


 


    Urine, Voided 4  


 


  # Bowel Movements 0  














- Physical Exam


Head: Positive for: Atraumatic, Normocephalic


Pupils: Positive for: PERRL


Extroacular Muscles: Positive for: EOMI


Conjunctiva: Positive for: Normal


Mouth: Positive for: Moist Mucous Membranes


Neck: Positive for: Normal Range of Motion


Respiratory/Chest: Positive for: Clear to Auscultation, Good Air Exchange.  

Negative for: Respiratory Distress, Accessory Muscle Use


Cardiovascular: Positive for: Regular Rate and Rhythm, Normal S1, S2.  Negative 

for: Murmurs


Abdomen: Negative for: Tenderness, Distention, Peritoneal Signs


Back: Positive for: Normal Inspection


Upper Extremity: Positive for: Normal Inspection.  Negative for: Cyanosis, Edema


Lower Extremity: Positive for: Swelling (+right lower leg swelling, no new 

swelling), Other (Dressing noted in R femoral region. Clean/dry/intact)


Neurological: Positive for: GCS=15, CN II-XII Intact, Speech Normal


Skin: Positive for: Warm, Dry, Normal Color.  Negative for: Rashes


Psychiatric: Positive for: Alert, Oriented x 3, Normal Insight, Normal 

Concentration





- Medications


Active Medications: 


Active Medications











Generic Name Dose Route Start Last Admin





  Trade Name Freq  PRN Reason Stop Dose Admin


 


Amlodipine Besylate  10 mg  11/10/18 10:00  11/14/18 10:09





  Norvasc  PO   Not Given





  DAILY TANNER   





     





     





     





     


 


Aspirin  81 mg  11/10/18 10:00  11/14/18 10:07





  Aspirin Chewable  PO   81 mg





  DAILY TANNER   Administration





     





     





     





     


 


Bacitracin  1 gm  11/12/18 18:00  11/14/18 10:07





  Bacitracin  TOP   1 applic





  BID TANNER   Administration





     





     





     





     


 


Chlorthalidone  25 mg  11/12/18 10:00  11/14/18 10:08





  Hygroton  PO   25 mg





  DAILY TANNER   Administration





     





     





     





     


 


Clonidine HCl  0.2 mg  11/13/18 10:00  11/14/18 10:08





  Catapres  PO   Not Given





  TID TANNER   





     





     





     





     


 


Clopidogrel Bisulfate  75 mg  11/15/18 10:00  





  Plavix  PO   





  DAILY TANNER   





     





     





     





     


 


Sodium Chloride  1,000 mls @ 125 mls/hr  11/14/18 14:30  11/14/18 14:55





  Sodium Chloride 0.45%  IV  11/16/18 12:00  125 mls/hr





  .Q8H TANNER   Administration





     





     





     





     


 


Losartan Potassium  100 mg  11/10/18 10:00  11/14/18 10:08





  Cozaar  PO   Not Given





  DAILY TANNER   





     





     





     





     


 


Metoprolol Succinate  25 mg  11/13/18 10:00  11/14/18 10:09





  Toprol Xl  PO   Not Given





  DAILY TANNER   





     





     





     





     


 


Polyethylene Glycol  17 gm  11/12/18 10:00  11/14/18 10:09





  Miralax  PO   17 gm





  DAILY ATNNER   Administration





     





     





     





     


 


Potassium Chloride  20 meq  11/11/18 08:00  11/14/18 08:00





  K-Dur 20 Meq Er Tab  PO   20 meq





  BRK TANNER   Administration





     





     





     





     














Review of Systems





- Review of Systems


Review of Systems: 





per HPI





Critical Care Progress Note





- Nutrition


Nutrition: 


                                    Nutrition











 Category Date Time Status


 


 Heart Healthy Diet [DIET] Diets  11/09/18 Breakfast Active














Assessment/Plan





- Assessment and Plan (Free Text)


Assessment: 





86 y/o admitted for hypertensive urgency likely secondary to renovascular 

hypertension seen on MRA/Renal duplex. She is s/p b/l renal artery 

revascularization with stents. Admitted to ICU for post-operative management. 


Plan: 





Hypertensive urgency 2/2 bilateral renal artery stenosis


s/p b/l renal stents


continue oral anti-hypertensives:


amlodipine 10mg


chlorthalidone 25mg


clonidine 0.2mg


losartan 100mg


metoprolol 25mg


Continue aspirin/plavix per IR recs


Finney in place


NS bolus followed by maintenance fluid @ 80mls/hour


f/u nephrology recs for medication adjustments





DVT/GI Ppx:

## 2018-11-15 LAB
ALBUMIN SERPL-MCNC: 3.8 G/DL (ref 3–4.8)
ALBUMIN/GLOB SERPL: 1.2 {RATIO} (ref 1.1–1.8)
ALT SERPL-CCNC: 22 U/L (ref 7–56)
APTT BLD: 28 SECONDS (ref 25.1–36.5)
AST SERPL-CCNC: 25 U/L (ref 14–36)
BASOPHILS # BLD AUTO: 0.01 K/MM3 (ref 0–2)
BASOPHILS NFR BLD: 0.1 % (ref 0–3)
BUN SERPL-MCNC: 26 MG/DL (ref 7–21)
CALCIUM SERPL-MCNC: 9.8 MG/DL (ref 8.4–10.5)
EOSINOPHIL # BLD: 0.2 10*3/UL (ref 0–0.7)
EOSINOPHIL NFR BLD: 2.9 % (ref 1.5–5)
ERYTHROCYTE [DISTWIDTH] IN BLOOD BY AUTOMATED COUNT: 13.1 % (ref 11.5–14.5)
GFR NON-AFRICAN AMERICAN: 53
GRANULOCYTES # BLD: 5.58 10*3/UL (ref 1.4–6.5)
GRANULOCYTES NFR BLD: 66.6 % (ref 50–68)
HGB BLD-MCNC: 12.5 G/DL (ref 12–16)
INR PPP: 1.14
LYMPHOCYTES # BLD: 1.9 10*3/UL (ref 1.2–3.4)
LYMPHOCYTES NFR BLD AUTO: 22.2 % (ref 22–35)
MCH RBC QN AUTO: 30 PG (ref 25–35)
MCHC RBC AUTO-ENTMCNC: 32.1 G/DL (ref 31–37)
MCV RBC AUTO: 93.5 FL (ref 80–105)
MONOCYTES # BLD AUTO: 0.7 10*3/UL (ref 0.1–0.6)
MONOCYTES NFR BLD: 8.2 % (ref 1–6)
PLATELET # BLD: 218 10^3/UL (ref 120–450)
PMV BLD AUTO: 9.1 FL (ref 7–11)
PROTHROMBIN TIME: 13.1 SECONDS (ref 9.4–12.5)
RBC # BLD AUTO: 4.16 10^6/UL (ref 3.5–6.1)
WBC # BLD AUTO: 8.4 10^3/UL (ref 4.5–11)

## 2018-11-15 RX ADMIN — POTASSIUM CHLORIDE SCH MEQ: 20 TABLET, EXTENDED RELEASE ORAL at 08:30

## 2018-11-15 RX ADMIN — SILVER SULFADIAZINE SCH GM: 10 CREAM TOPICAL at 10:58

## 2018-11-15 RX ADMIN — BACITRACIN SCH APPLIC: 500 OINTMENT TOPICAL at 10:33

## 2018-11-15 RX ADMIN — POLYETHYLENE GLYCOL 3350 SCH GM: 17 POWDER, FOR SOLUTION ORAL at 10:34

## 2018-11-15 NOTE — CP.CCUPN
<Elias Szymanski - Last Filed: 11/15/18 11:14>





CCU Subjective





- Physician Review


Subjective (Free Text): 





CRITICAL CARE PROGRESS NOTE FOR DR. MAXI Szymanski PGY-1





Pt seen and examined at bedside. No acute complaints or nursing events 

overnight. 12 point ROS is negative. 











CCU Objective





- Vital Signs / Intake & Output


Vital Signs (Last 4 hours): 


Vital Signs











  Pulse BP


 


 11/15/18 10:35  52 L 


 


 11/15/18 10:33   174/61 H











Intake and Output (Last 8hrs): 


                                 Intake & Output











 11/14/18 11/15/18 11/15/18





 22:59 06:59 14:59


 


Intake Total 800 1080 


 


Output Total 600 2950 


 


Balance 200 -1870 


 


Weight  73.754 kg 


 


Intake:   


 


   960 


 


    Left Hand 800 960 


 


  Oral  120 


 


Output:   


 


  Urine 600 2950 


 


    Urethral (Finney) 600  


 


    Urine, Voided  2950 


 


Other:   


 


  # Bowel Movements  0 














- Physical Exam


Head: Positive for: Atraumatic, Normocephalic


Pupils: Positive for: PERRL


Extroacular Muscles: Positive for: EOMI


Conjunctiva: Positive for: Normal


Mouth: Positive for: Moist Mucous Membranes


Neck: Positive for: Normal Range of Motion


Respiratory/Chest: Positive for: Clear to Auscultation, Good Air Exchange.  

Negative for: Respiratory Distress, Accessory Muscle Use


Cardiovascular: Positive for: Regular Rate and Rhythm, Normal S1, S2.  Negative 

for: Murmurs


Abdomen: Negative for: Tenderness, Distention, Peritoneal Signs


Back: Positive for: Normal Inspection


Upper Extremity: Positive for: Normal Inspection.  Negative for: Cyanosis, Edema


Lower Extremity: Positive for: Swelling (+right lower leg swelling, no new 

swelling)


Neurological: Positive for: GCS=15, CN II-XII Intact, Speech Normal


Skin: Positive for: Warm, Dry, Normal Color.  Negative for: Rashes


Psychiatric: Positive for: Alert, Oriented x 3, Normal Insight, Normal 

Concentration





- Medications


Active Medications: 


Active Medications











Generic Name Dose Route Start Last Admin





  Trade Name Freq  PRN Reason Stop Dose Admin


 


Amlodipine Besylate  10 mg  11/10/18 10:00  11/15/18 10:33





  Norvasc  PO   10 mg





  DAILY TANNER   Administration





     





     





     





     


 


Aspirin  81 mg  11/10/18 10:00  11/15/18 09:52





  Aspirin Chewable  PO   81 mg





  DAILY TANNER   Administration





     





     





     





     


 


Bacitracin  1 gm  11/12/18 18:00  11/15/18 10:33





  Bacitracin  TOP   1 applic





  BID TANNER   Administration





     





     





     





     


 


Chlorthalidone  25 mg  11/12/18 10:00  11/15/18 10:34





  Hygroton  PO   25 mg





  DAILY TANNER   Administration





     





     





     





     


 


Clonidine HCl  0.2 mg  11/13/18 10:00  11/15/18 10:35





  Catapres  PO   0.2 mg





  TID TANNER   Administration





     





     





     





     


 


Clopidogrel Bisulfate  75 mg  11/15/18 10:00  11/15/18 10:36





  Plavix  PO   75 mg





  DAILY TANNER   Administration





     





     





     





     


 


Heparin Sodium (Porcine)  5,000 units  11/14/18 22:00  11/15/18 10:34





  Heparin  SC   5,000 units





  Q12 TANNER   Administration





     





     





  Protocol   





     


 


Sodium Chloride  1,000 mls @ 125 mls/hr  11/14/18 14:30  11/14/18 14:55





  Sodium Chloride 0.45%  IV  11/16/18 12:00  125 mls/hr





  .Q8H TANNER   Administration





     





     





     





     


 


Sodium Chloride  1,000 mls @ 80 mls/hr  11/14/18 20:00  11/15/18 08:36





  Sodium Chloride 0.45%  IV   80 mls/hr





  .[id number] TANNER   Administration





     





     





     





     


 


Losartan Potassium  100 mg  11/10/18 10:00  11/15/18 10:35





  Cozaar  PO   100 mg





  DAILY TANNER   Administration





     





     





     





     


 


Metoprolol Succinate  25 mg  11/15/18 09:24  





  Toprol Xl  PO   





  DAILY TANNER   





     





     





     





     


 


Pantoprazole Sodium  40 mg  11/16/18 07:30  





  Protonix Ec Tab  PO   





  ACB TANNER   





     





     





     





     


 


Polyethylene Glycol  17 gm  11/12/18 10:00  11/15/18 10:34





  Miralax  PO   17 gm





  DAILY TANNER   Administration





     





     





     





     


 


Potassium Chloride  20 meq  11/11/18 08:00  11/15/18 08:30





  K-Dur 20 Meq Er Tab  PO   20 meq





  BRK TANNER   Administration





     





     





     





     


 


Silver Sulfadiazine  0 gm  11/15/18 10:00  





  Silvadene 1% 25 Gm  TP   





  DAILY TANNER   





     





     





     





     














- Patient Studies


Lab Studies: 


                                   Lab Studies











  11/15/18 11/15/18 11/15/18 Range/Units





  05:30 05:30 05:30 


 


WBC    8.4  (4.5-11.0)  10^3/uL


 


RBC    4.16  (3.5-6.1)  10^6/uL


 


Hgb    12.5  (12.0-16.0)  g/dL


 


Hct    38.9  (36.0-48.0)  %


 


MCV    93.5  (80.0-105.0)  fl


 


MCH    30.0  (25.0-35.0)  pg


 


MCHC    32.1  (31.0-37.0)  g/dl


 


RDW    13.1  (11.5-14.5)  %


 


Plt Count    218  (120.0-450.0)  10^3/uL


 


MPV    9.1  (7.0-11.0)  fl


 


Gran %    66.6  (50.0-68.0)  %


 


Lymph % (Auto)    22.2  (22.0-35.0)  %


 


Mono % (Auto)    8.2 H  (1.0-6.0)  %


 


Eos % (Auto)    2.9  (1.5-5.0)  %


 


Baso % (Auto)    0.1  (0.0-3.0)  %


 


Gran #    5.58  (1.4-6.5)  


 


Lymph # (Auto)    1.9  (1.2-3.4)  


 


Mono # (Auto)    0.7 H  (0.1-0.6)  


 


Eos # (Auto)    0.2  (0.0-0.7)  


 


Baso # (Auto)    0.01  (0.0-2.0)  K/mm3


 


PT  13.1 H    (9.4-12.5)  SECONDS


 


INR  1.14    


 


APTT  28.0    (25.1-36.5)  Seconds


 


Sodium   140   (132-148)  mmol/L


 


Potassium   4.7   (3.6-5.0)  mmol/L


 


Chloride   106   ()  mmol/L


 


Carbon Dioxide   28   (21-33)  mmol/L


 


Anion Gap   11   (10-20)  


 


BUN   26 H   (7-21)  mg/dL


 


Creatinine   1.0   (0.7-1.2)  mg/dl


 


Est GFR (African Amer)   > 60   


 


Est GFR (Non-Af Amer)   53   


 


Random Glucose   94   ()  mg/dL


 


Calcium   9.8   (8.4-10.5)  mg/dL


 


Phosphorus   3.7   (2.5-4.5)  mg/dL


 


Magnesium   2.0   (1.7-2.2)  mg/dL


 


Total Bilirubin   0.6   (0.2-1.3)  mg/dL


 


AST   25   (14-36)  U/L


 


ALT   22   (7-56)  U/L


 


Alkaline Phosphatase   58   ()  U/L


 


Total Protein   7.0   (5.8-8.3)  g/dL


 


Albumin   3.8   (3.0-4.8)  g/dL


 


Globulin   3.2   gm/dL


 


Albumin/Globulin Ratio   1.2   (1.1-1.8)  








                         Laboratory Results - last 24 hr











  11/15/18 11/15/18 11/15/18





  05:30 05:30 05:30


 


WBC  8.4  


 


RBC  4.16  


 


Hgb  12.5  


 


Hct  38.9  


 


MCV  93.5  


 


MCH  30.0  


 


MCHC  32.1  


 


RDW  13.1  


 


Plt Count  218  


 


MPV  9.1  


 


Gran %  66.6  


 


Lymph % (Auto)  22.2  


 


Mono % (Auto)  8.2 H  


 


Eos % (Auto)  2.9  


 


Baso % (Auto)  0.1  


 


Gran #  5.58  


 


Lymph # (Auto)  1.9  


 


Mono # (Auto)  0.7 H  


 


Eos # (Auto)  0.2  


 


Baso # (Auto)  0.01  


 


PT    13.1 H


 


INR    1.14


 


APTT    28.0


 


Sodium   140 


 


Potassium   4.7 


 


Chloride   106 


 


Carbon Dioxide   28 


 


Anion Gap   11 


 


BUN   26 H 


 


Creatinine   1.0 


 


Est GFR ( Amer)   > 60 


 


Est GFR (Non-Af Amer)   53 


 


Random Glucose   94 


 


Calcium   9.8 


 


Phosphorus   3.7 


 


Magnesium   2.0 


 


Total Bilirubin   0.6 


 


AST   25 


 


ALT   22 


 


Alkaline Phosphatase   58 


 


Total Protein   7.0 


 


Albumin   3.8 


 


Globulin   3.2 


 


Albumin/Globulin Ratio   1.2 














Review of Systems





- Review of Systems


Review of Systems: 





per [facility]





Critical Care Progress Note





- Nutrition


Nutrition: 


                                    Nutrition











 Category Date Time Status


 


 Heart Healthy Diet [DIET] Diets  11/09/18 Breakfast Active














Assessment/Plan





- Assessment and Plan (Free Text)


Assessment: 





86 y/o admitted for hypertensive urgency likely secondary to renovascular 

hypertension seen on MRA/Renal duplex. She is s/p b/l renal artery 

revascularization with stents. Admitted to ICU for post-operative management. 


Plan: 





Hypertensive urgency 2/2 bilateral renal artery stenosis


s/p renal artery revascularization with b/l renal stents


continue oral anti-hypertensives:


amlodipine 10mg


chlorthalidone 25mg


clonidine 0.2mg


losartan 100mg


metoprolol 25mg


Continue aspirin/plavix per IR recs


Finney in place


Discontinue maintenence fluids as pt is hypertensive


f/u nephrology recs for medication adjustments





DVT/GI Ppx: Hep/protonix


Dispo: Pt is s/p b/l renal revascularization with b/l stent placement. Tolerated

 procedure well. No acute complaints. Vital signs stable. Pt is stable for 

transfer to telemetry. Please re-consult if necessary





Case seen, examined and discussed with attending physician, Dr. Lay








<[place] - Last Filed: 11/15/18 12:49>





CCU Objective





- Vital Signs / Intake & Output


Vital Signs (Last 4 hours): 


Vital Signs











  Pulse BP


 


 11/15/18 10:35  52 L 


 


 11/15/18 10:33   174/61 H











Intake and Output (Last 8hrs): 


                                 Intake & Output











 11/14/18 11/15/18 11/15/18





 22:59 06:59 14:59


 


Intake Total 800 1080 


 


Output Total 600 2950 


 


Balance 200 -1870 


 


Weight  73.754 kg 


 


Intake:   


 


   960 


 


    Left Hand 800 960 


 


  Oral  120 


 


Output:   


 


  Urine 600 2950 


 


    Urethral (Finney) 600  


 


    Urine, Voided  2950 


 


Other:   


 


  # Bowel Movements  0 














- Medications


Active Medications: 


Active Medications











Generic Name Dose Route Start Last Admin





  Trade Name Freq  PRN Reason Stop Dose Admin


 


Amlodipine Besylate  10 mg  11/10/18 10:00  11/15/18 10:33





  Norvasc  PO   10 mg





  DAILY TANNER   Administration





     





     





     





     


 


Aspirin  81 mg  11/10/18 10:00  11/15/18 09:52





  Aspirin Chewable  PO   81 mg





  DAILY TANNER   Administration





     





     





     





     


 


Bacitracin  1 gm  11/12/18 18:00  11/15/18 10:33





  Bacitracin  TOP   1 applic





  BID TANNER   Administration





     





     





     





     


 


Chlorthalidone  25 mg  11/12/18 10:00  11/15/18 10:34





  Hygroton  PO   25 mg





  DAILY TANNER   Administration





     





     





     





     


 


Clonidine HCl  0.2 mg  11/13/18 10:00  11/15/18 10:35





  Catapres  PO   0.2 mg





  TID TANNER   Administration





     





     





     





     


 


Clopidogrel Bisulfate  75 mg  11/15/18 10:00  11/15/18 10:36





  Plavix  PO   75 mg





  DAILY TANNER   Administration





     





     





     





     


 


Heparin Sodium (Porcine)  5,000 units  11/14/18 22:00  11/15/18 10:34





  Heparin  SC   5,000 units





  Q12 TANNER   Administration





     





     





  Protocol   





     


 


Sodium Chloride  1,000 mls @ 125 mls/hr  11/14/18 14:30  11/14/18 14:55





  Sodium Chloride 0.45%  IV  11/16/18 12:00  125 mls/hr





  .Q8H TANNER   Administration





     





     





     





     


 


Sodium Chloride  1,000 mls @ 80 mls/hr  11/14/18 20:00  11/15/18 08:36





  Sodium Chloride 0.45%  IV   80 mls/hr





  .[id number] TANNER   Administration





     





     





     





     


 


Losartan Potassium  100 mg  11/10/18 10:00  11/15/18 10:35





  Cozaar  PO   100 mg





  DAILY TANNER   Administration





     





     





     





     


 


Metoprolol Succinate  25 mg  11/15/18 09:24  





  Toprol Xl  PO   





  DAILY TANNER   





     





     





     





     


 


Pantoprazole Sodium  40 mg  11/16/18 07:30  





  Protonix Ec Tab  PO   





  ACB TANNER   





     





     





     





     


 


Polyethylene Glycol  17 gm  11/12/18 10:00  11/15/18 10:34





  Miralax  PO   17 gm





  DAILY TANNER   Administration





     





     





     





     


 


Potassium Chloride  20 meq  11/11/18 08:00  11/15/18 08:30





  K-Dur 20 Meq Er Tab  PO   20 meq





  BRK TANNER   Administration





     





     





     





     


 


Silver Sulfadiazine  0 gm  11/15/18 10:00  





  Silvadene 1% 25 Gm  TP   





  DAILY TANNER   





     





     





     





     














- Patient Studies


Lab Studies: 


                                   Lab Studies











  11/15/18 11/15/18 11/15/18 Range/Units





  05:30 05:30 05:30 


 


WBC    8.4  (4.5-11.0)  10^3/uL


 


RBC    4.16  (3.5-6.1)  10^6/uL


 


Hgb    12.5  (12.0-16.0)  g/dL


 


Hct    38.9  (36.0-48.0)  %


 


MCV    93.5  (80.0-105.0)  fl


 


MCH    30.0  (25.0-35.0)  pg


 


MCHC    32.1  (31.0-37.0)  g/dl


 


RDW    13.1  (11.5-14.5)  %


 


Plt Count    218  (120.0-450.0)  10^3/uL


 


MPV    9.1  (7.0-11.0)  fl


 


Gran %    66.6  (50.0-68.0)  %


 


Lymph % (Auto)    22.2  (22.0-35.0)  %


 


Mono % (Auto)    8.2 H  (1.0-6.0)  %


 


Eos % (Auto)    2.9  (1.5-5.0)  %


 


Baso % (Auto)    0.1  (0.0-3.0)  %


 


Gran #    5.58  (1.4-6.5)  


 


Lymph # (Auto)    1.9  (1.2-3.4)  


 


Mono # (Auto)    0.7 H  (0.1-0.6)  


 


Eos # (Auto)    0.2  (0.0-0.7)  


 


Baso # (Auto)    0.01  (0.0-2.0)  K/mm3


 


PT  13.1 H    (9.4-12.5)  SECONDS


 


INR  1.14    


 


APTT  28.0    (25.1-36.5)  Seconds


 


Sodium   140   (132-148)  mmol/L


 


Potassium   4.7   (3.6-5.0)  mmol/L


 


Chloride   106   ()  mmol/L


 


Carbon Dioxide   28   (21-33)  mmol/L


 


Anion Gap   11   (10-20)  


 


BUN   26 H   (7-21)  mg/dL


 


Creatinine   1.0   (0.7-1.2)  mg/dl


 


Est GFR (African Amer)   > 60   


 


Est GFR (Non-Af Amer)   53   


 


Random Glucose   94   ()  mg/dL


 


Calcium   9.8   (8.4-10.5)  mg/dL


 


Phosphorus   3.7   (2.5-4.5)  mg/dL


 


Magnesium   2.0   (1.7-2.2)  mg/dL


 


Total Bilirubin   0.6   (0.2-1.3)  mg/dL


 


AST   25   (14-36)  U/L


 


ALT   22   (7-56)  U/L


 


Alkaline Phosphatase   58   ()  U/L


 


Total Protein   7.0   (5.8-8.3)  g/dL


 


Albumin   3.8   (3.0-4.8)  g/dL


 


Globulin   3.2   gm/dL


 


Albumin/Globulin Ratio   1.2   (1.1-1.8)  








                         Laboratory Results - last 24 hr











  11/15/18 11/15/18 11/15/18





  05:30 05:30 05:30


 


WBC  8.4  


 


RBC  4.16  


 


Hgb  12.5  


 


Hct  38.9  


 


MCV  93.5  


 


MCH  30.0  


 


MCHC  32.1  


 


RDW  13.1  


 


Plt Count  218  


 


MPV  9.1  


 


Gran %  66.6  


 


Lymph % (Auto)  22.2  


 


Mono % (Auto)  8.2 H  


 


Eos % (Auto)  2.9  


 


Baso % (Auto)  0.1  


 


Gran #  5.58  


 


Lymph # (Auto)  1.9  


 


Mono # (Auto)  0.7 H  


 


Eos # (Auto)  0.2  


 


Baso # (Auto)  0.01  


 


PT    13.1 H


 


INR    1.14


 


APTT    28.0


 


Sodium   140 


 


Potassium   4.7 


 


Chloride   106 


 


Carbon Dioxide   28 


 


Anion Gap   11 


 


BUN   26 H 


 


Creatinine   1.0 


 


Est GFR ( Amer)   > 60 


 


Est GFR (Non-Af Amer)   53 


 


Random Glucose   94 


 


Calcium   9.8 


 


Phosphorus   3.7 


 


Magnesium   2.0 


 


Total Bilirubin   0.6 


 


AST   25 


 


ALT   22 


 


Alkaline Phosphatase   58 


 


Total Protein   7.0 


 


Albumin   3.8 


 


Globulin   3.2 


 


Albumin/Globulin Ratio   1.2 














Critical Care Progress Note





- Nutrition


Nutrition: 


                                    Nutrition











 Category Date Time Status


 


 Heart Healthy Diet [DIET] Diets  11/09/18 Breakfast Active














Addendum


Addendum: 





11/15/18 12:49


ICU Attending Addendum 


Patient seen and examined. Case reviewed on round with housestaff.  Agree with 

resident note above with the following additions/exceptions:





85F with uncontrolled htn s/p MRA/Renal duplex suggesting need for b/l renal 

artery revascularization with stents. Admitted to ICU for post-operative 

management. 





cardio on board managing bp meds


would hold BB if HR < 50


continue other oral anti-hypertensives:


Continue aspirin/plavix per IR recs





Stable for transfer to [facility]





Rest of care as above


Belle Lay MD


Pulmonary Critical Care and Sleep Medicine

## 2018-11-15 NOTE — PN
DATE:  11/15/2018



SUBJECTIVE:  The patient is 85 years old, seen and examined.  The patient

underwent renal artery angioplasty by Dr. Zia Gordon yesterday. 

Initially, she had bilateral renal arteriogram and she had left artery

angioplasty and stent placement and right renal artery origin angioplasty

and stent placement.  The patient was placed in the ICU for close

observation.  The patient did well overnight.  No nausea, vomiting.  No

diarrhea, no headache.



PHYSICAL EXAMINATION

VITAL SIGNS:  She is afebrile, pulse 59, respirations 18, blood pressure

154/58.

LUNGS:  Bilateral fair airflow.  No rhonchi or crackles.

HEART:  S1, S2 audible.

ABDOMEN:  Soft, nontender.  No rebound, no guarding.

NEUROLOGIC:  The patient is awake, alert, oriented.  Communicative.



LABORATORY DATA:  WBC is 8.4, hemoglobin 12.5, hematocrit 38.9, platelet of

218,000.  PT 13.1, INR 1.14.  Chemistry; sodium 140, potassium 4.7,

chloride 106, CO2 of 28, BUN 26, creatinine 1.0, blood sugar 94.



ASSESSMENT:

1.  Fluctuating hypertension.

2.  Bilateral renal artery angioplasty.

3.  Hypertension.

4.  Hyperlipidemia.

5.  Constipation.



PLAN:  We will discontinue the patient's IV fluids.  Continue her on

aspirin and Plavix.  We will give her a dose of magnesium citrate. 

Continue her on losartan, chlorthalidone, and amlodipine, and she has been

started on metoprolol.





__________________________________________

Pamela Ram MD





__________________________________________

 (Delete this signature block when dictator is a preceptor.)



cc:  Name, MD (Delete if not dictated.)



DD:  11/15/2018 16:18:21

DT:  11/15/2018 18:31:56

Job # [id number]

## 2018-11-16 LAB
ALBUMIN SERPL-MCNC: 3.6 G/DL (ref 3–4.8)
ALBUMIN/GLOB SERPL: 1.1 {RATIO} (ref 1.1–1.8)
ALT SERPL-CCNC: 18 U/L (ref 7–56)
AST SERPL-CCNC: 22 U/L (ref 14–36)
BASOPHILS # BLD AUTO: 0.01 K/MM3 (ref 0–2)
BASOPHILS NFR BLD: 0.1 % (ref 0–3)
BUN SERPL-MCNC: 27 MG/DL (ref 7–21)
CALCIUM SERPL-MCNC: 10.1 MG/DL (ref 8.4–10.5)
EOSINOPHIL # BLD: 0.2 10*3/UL (ref 0–0.7)
EOSINOPHIL NFR BLD: 2.1 % (ref 1.5–5)
ERYTHROCYTE [DISTWIDTH] IN BLOOD BY AUTOMATED COUNT: 13.2 % (ref 11.5–14.5)
GFR NON-AFRICAN AMERICAN: 47
GRANULOCYTES # BLD: 5.43 10*3/UL (ref 1.4–6.5)
GRANULOCYTES NFR BLD: 61.3 % (ref 50–68)
HGB BLD-MCNC: 12.2 G/DL (ref 12–16)
LYMPHOCYTES # BLD: 2.5 10*3/UL (ref 1.2–3.4)
LYMPHOCYTES NFR BLD AUTO: 27.6 % (ref 22–35)
MCH RBC QN AUTO: 30 PG (ref 25–35)
MCHC RBC AUTO-ENTMCNC: 32.1 G/DL (ref 31–37)
MCV RBC AUTO: 93.4 FL (ref 80–105)
MONOCYTES # BLD AUTO: 0.8 10*3/UL (ref 0.1–0.6)
MONOCYTES NFR BLD: 8.9 % (ref 1–6)
PLATELET # BLD: 209 10^3/UL (ref 120–450)
PMV BLD AUTO: 9 FL (ref 7–11)
RBC # BLD AUTO: 4.07 10^6/UL (ref 3.5–6.1)
WBC # BLD AUTO: 8.9 10^3/UL (ref 4.5–11)

## 2018-11-16 RX ADMIN — POTASSIUM CHLORIDE SCH MEQ: 20 TABLET, EXTENDED RELEASE ORAL at 09:54

## 2018-11-16 RX ADMIN — PANTOPRAZOLE SODIUM SCH MG: 40 TABLET, DELAYED RELEASE ORAL at 09:55

## 2018-11-16 RX ADMIN — BACITRACIN SCH: 500 OINTMENT TOPICAL at 13:39

## 2018-11-16 RX ADMIN — BACITRACIN SCH: 500 OINTMENT TOPICAL at 17:25

## 2018-11-16 RX ADMIN — POLYETHYLENE GLYCOL 3350 SCH GM: 17 POWDER, FOR SOLUTION ORAL at 09:54

## 2018-11-16 RX ADMIN — SILVER SULFADIAZINE SCH: 10 CREAM TOPICAL at 13:39

## 2018-11-16 NOTE — PN
DATE:  11/16/2018



SUBJECTIVE:  The patient is 85-year-old, doing well, sleeping better. 

Blood pressure seems to be running better.  No nausea, vomiting.  No

diarrhea.  Did have _____ bowel movement yesterday, feels lot better.



PHYSICAL EXAMINATION:

VITAL SIGNS:  She is afebrile, pulse 66, respirations 18 and blood pressure

147/61.

LUNGS:  Bilateral good airflow.  No rhonchi or crackle.

HEART:  S1 and S2, audible.

ABDOMEN:  Soft and nontender.  No rebound.  No guarding.

NEUROLOGICAL:  The patient is awake, alert, oriented and communicative.



LABORATORY DATA:  WBC 8.9, hemoglobin 12, hematocrit 38 and platelet

209,000.  Chemistry; sodium 140, potassium 4.4, chloride 104, CO2 of 30,

BUN 27 creatinine 1.1 and blood sugar of 98.



ASSESSMENT AND PLAN:

1.  Fluctuating blood pressure.

2.  Bilateral renal artery stenosis, status post angioplasty.

3.  Constipation, that has relieved.

4.  Hyperlipidemia.

5.  History of uterine cancer.

6.  The patient states that she is having bilateral ankle edema.



PLAN:  We will discontinue her Norvasc, the patient is more familiar and

comfortable with verapamil that she has been taking before for her better

control of heart rate and blood pressure monitoring and she was on losartan

before, we will continue that.  We will change clonidine to p.r.n. for

systolic blood pressure more than 145, we will monitor her blood pressure

for next 24-hour and make further recommendation, if she remains stable

possible discharge in the a.m.







__________________________________________

Pamela Ram MD





DD:  11/16/2018 12:56:36

DT:  11/16/2018 13:24:45

Job # [id number]

## 2018-11-16 NOTE — PN
DATE:  11/16/2018



SUBJECTIVE:  The patient is seen sitting in bed.  She is awake.  She is

alert.  She is comfortable.  She reports feeling better.  She reports that

she was having some abdominal discomfort yesterday.  She had been

constipated for 6 days.  She got citrate of magnesium.  After that, she has

felt bloated and felt some chest pressure, but everything has resolved now

because she has had three bowel movements.



PHYSICAL EXAMINATION:

GENERAL:  Elderly lady lying in bed.

VITAL SIGNS:  Blood pressure 147/61, heart rate 66, respiratory rate 18,

temperature 97.8.

HEENT:  Normocephalic, atraumatic, positive pallor.

NECK:  Supple, no JVD.

LUNGS:  Bilateral equal air entry, bilateral equal expansion.

CARDIAC:  S1 and S2, regular rate and rhythm, no murmur, no rub.

ABDOMEN:  Obese, distended, soft, nontender, bowel sounds present.

EXTREMITIES:  No lower extremity edema.



LABORATORY DATA:  WBC 8.9, hemoglobin 12, hematocrit 38, platelets 209. 

Sodium 140, potassium 4.4, chloride 104, CO2 of 30, BUN 27, creatinine 1.1,

glucose 98, calcium 10.1, albumin 3.6.  TSH 2.8.



CURRENT MEDICATIONS:  Aspirin, bacitracin, Catapres 0.2 t.i.d., losartan

100, chlorthalidone 25 daily, potassium 20 mEq daily, MiraLax, amlodipine

10, Plavix 75, Protonix, Toprol XL 25.



ASSESSMENT AND PLAN:

1.  Severe hypertension.

2.  Bilateral renal artery stenosis, status post bilateral renal artery

stents.

3.  Hyperlipidemia.

4.  History of uterine cancer.



PLAN

1.  Renal parameters are stable post angiogram/angioplasty/stenting of both

renal arteries.

2.  Blood pressure seems to be better controlled.

3.  Taper of clonidine as possible.

4.  No objection to discharge.

5.  Needs outpatient followup.

6.  We will change clonidine to 0.1 mg three times a day for the time

being.







__________________________________________

Katiuska Alfred MD









DD:  11/16/2018 12:03:32

DT:  11/16/2018 12:07:09

Job # [id number]

## 2018-11-17 RX ADMIN — POTASSIUM CHLORIDE SCH MEQ: 20 TABLET, EXTENDED RELEASE ORAL at 08:35

## 2018-11-17 RX ADMIN — PANTOPRAZOLE SODIUM SCH MG: 40 TABLET, DELAYED RELEASE ORAL at 09:35

## 2018-11-17 RX ADMIN — POLYETHYLENE GLYCOL 3350 SCH GM: 17 POWDER, FOR SOLUTION ORAL at 09:32

## 2018-11-17 RX ADMIN — BACITRACIN SCH: 500 OINTMENT TOPICAL at 09:32

## 2018-11-17 RX ADMIN — BACITRACIN SCH: 500 OINTMENT TOPICAL at 17:43

## 2018-11-17 RX ADMIN — METOPROLOL SUCCINATE SCH MG: 25 TABLET, EXTENDED RELEASE ORAL at 11:24

## 2018-11-17 RX ADMIN — SILVER SULFADIAZINE SCH: 10 CREAM TOPICAL at 09:47

## 2018-11-17 NOTE — PN
DATE:  11/17/2018



SUBJECTIVE:  The patient is seen sitting in bed.  Daughter is at bedside. 

The patient reports that her blood pressure was high this morning.  Also,

she reports that her heart rate was high this morning.  As per the

discussion with the PMD, there might have been misplaced lead.  Currently,

the patient reports she is feeling better.  She is feeling good.  She

denies any headaches.  She denies any chest pain.  She denies any

palpitations.



Blood pressure has been recorded as 172/76 this morning.  Currently, the

blood pressure is 145/66.



PHYSICAL EXAMINATION:

GENERAL:  Elderly lady sitting in bed.

VITAL SIGNS:  Blood pressure 145/66, heart rate 87, respiratory rate 20,

temperature 97.8.

HEENT:  Normocephalic, atraumatic, positive pallor.

NECK:  Supple, no JVD.

LUNGS:  Bilateral equal air entry, bilateral equal expansion, no rales.

CARDIAC:  S1 and S2, regular rate and rhythm, no murmur, no rub.

ABDOMEN:  Obese, distended, soft, nontender, bowel sounds present.

EXTREMITIES:  Trace lower extremity edema.

INTAKE AND OUTPUT:  Not charted.



LABORATORY DATA:  No new labs today.



CURRENT MEDICATIONS:  Aspirin, bacitracin, clonidine 0.1 t.i.d. p.r.n.,

losartan 100, chlorthalidone 25, potassium 20 mEq, MiraLax, amlodipine 10,

Plavix, Protonix, Toprol XL 25 started this morning, hydralazine 10 mg

given this morning.



ASSESSMENT:

1.  The patient admitted with hypertensive emergency, history of recent

uncontrolled hypertension.  Found to have bilateral renal artery stenosis.

2.  Status post bilateral renal artery stents.

3.  Still with some lability of high blood pressure.

4.  Tachycardia,? related to amlodipine, the patient was not on amlodipine

as outpatient, she was on verapamil 300 mg daily as outpatient.



PLAN:

1.  Discontinue amlodipine.

2.  Restart verapamil.

3.  Continue p.r.n. clonidine.

4.  Continue chlorthalidone 25 mg ? seems like a high dose.  I am concerned

about electrolyte abnormalities.

5.  Check labs in a.m.



Case discussed at length with daughter who is at bedside, I have discussed

that hypertension can be fine tuned as outpatient.  She does not need to

stay in the hospital to normalize blood pressure, okay to go home if

pressure is reasonably controlled over the next 24 to 48 hours.





__________________________________________

Katiuska Alfred MD





DD:  11/17/2018 18:57:07

DT:  11/17/2018 19:02:05

Job # [id number]

## 2018-11-17 NOTE — PN
DATE:  11/17/2018



SUBJECTIVE:  The patient is 85 years old, seen and examined.  She states

she has episode of palpitation, felt nervous, shaking this morning.  Her

blood pressure also was 180/80.  The patient was given Catapres and she was

given losartan last night.  By the time I saw the patient, she is doing

well.  No nausea or vomiting.  No diarrhea.



PHYSICAL EXAMINATION:

VITAL SIGNS:  The patient is afebrile, pulse 83, respirations 20, blood

pressure 145/66.

LUNGS:  Bilateral fair airflow.  No rhonchi or crackle.

HEART:  S1 and S2 audible.

ABDOMEN:  Soft.  Nontender.  No rebound.  No guarding.

NEUROLOGICAL:  The patient is awake, alert, oriented, communicative.  Moves

all extremity.

EXTREMITIES:  Bilateral leg, no edema.



ASSESSMENT:

1.  Uncontrolled, fluctuating hypertension.

2.  Hyperlipidemia.

3.  Bilateral renal artery stenosis.

4.  Status post bilateral renal angioplasty.

5.  History of uterine cancer.



PLAN:  I discussed with Dr. Alfred.  The patient is already on aspirin 81

daily.  She is on Plavix 75 daily.  She is on Catapres 0.5 t.i.d. p.r.n. 

She is on losartan 100 mg daily and hydrochlorothiazide 25 daily.  I

discussed with Dr. Alfred.  For now, we will continue on losartan,

amlodipine and metoprolol and we will monitor for next 24 hours and make

further plan.  The patient's family by the bedside.  We will discuss with

their family doctor and they want to rule out PE, although it is low

suspicion; however, the patient has been in bed for the last few days.  We

will order for leg Doppler and I will order for CBC, CMP and D-dimer for

the morning.  If D-dimer is positive, I will order for CT angio since the

patient cannot get further contrast.  She recently had angioplasty done. 

If suspicion is high, I might order for V/Q scan.





__________________________________________

Pamela Ram MD





DD:  11/17/2018 17:02:59

DT:  11/17/2018 17:06:22

Job # [id number]

## 2018-11-18 LAB
ALBUMIN SERPL-MCNC: 4.1 G/DL (ref 3–4.8)
ALBUMIN/GLOB SERPL: 1.2 {RATIO} (ref 1.1–1.8)
ALT SERPL-CCNC: 19 U/L (ref 7–56)
AST SERPL-CCNC: 26 U/L (ref 14–36)
BASOPHILS # BLD AUTO: 0.02 K/MM3 (ref 0–2)
BASOPHILS NFR BLD: 0.2 % (ref 0–3)
BUN SERPL-MCNC: 29 MG/DL (ref 7–21)
CALCIUM SERPL-MCNC: 10.3 MG/DL (ref 8.4–10.5)
EOSINOPHIL # BLD: 0.3 10*3/UL (ref 0–0.7)
EOSINOPHIL NFR BLD: 2.9 % (ref 1.5–5)
ERYTHROCYTE [DISTWIDTH] IN BLOOD BY AUTOMATED COUNT: 13.5 % (ref 11.5–14.5)
GFR NON-AFRICAN AMERICAN: 47
GRANULOCYTES # BLD: 5.17 10*3/UL (ref 1.4–6.5)
GRANULOCYTES NFR BLD: 55.6 % (ref 50–68)
HGB BLD-MCNC: 12.9 G/DL (ref 12–16)
LYMPHOCYTES # BLD: 3 10*3/UL (ref 1.2–3.4)
LYMPHOCYTES NFR BLD AUTO: 31.9 % (ref 22–35)
MCH RBC QN AUTO: 30.1 PG (ref 25–35)
MCHC RBC AUTO-ENTMCNC: 32.2 G/DL (ref 31–37)
MCV RBC AUTO: 93.5 FL (ref 80–105)
MONOCYTES # BLD AUTO: 0.9 10*3/UL (ref 0.1–0.6)
MONOCYTES NFR BLD: 9.4 % (ref 1–6)
PLATELET # BLD: 208 10^3/UL (ref 120–450)
PMV BLD AUTO: 9.1 FL (ref 7–11)
RBC # BLD AUTO: 4.29 10^6/UL (ref 3.5–6.1)
WBC # BLD AUTO: 9.3 10^3/UL (ref 4.5–11)

## 2018-11-18 RX ADMIN — BACITRACIN SCH: 500 OINTMENT TOPICAL at 17:51

## 2018-11-18 RX ADMIN — METOPROLOL SUCCINATE SCH MG: 25 TABLET, EXTENDED RELEASE ORAL at 07:42

## 2018-11-18 RX ADMIN — BACITRACIN SCH APPLIC: 500 OINTMENT TOPICAL at 10:13

## 2018-11-18 RX ADMIN — POTASSIUM CHLORIDE SCH MEQ: 20 TABLET, EXTENDED RELEASE ORAL at 07:42

## 2018-11-18 RX ADMIN — ENOXAPARIN SODIUM SCH: 30 INJECTION SUBCUTANEOUS at 12:05

## 2018-11-18 RX ADMIN — SILVER SULFADIAZINE SCH GM: 10 CREAM TOPICAL at 10:16

## 2018-11-18 RX ADMIN — PANTOPRAZOLE SODIUM SCH MG: 40 TABLET, DELAYED RELEASE ORAL at 07:42

## 2018-11-18 RX ADMIN — VERAPAMIL HYDROCHLORIDE SCH MG: 240 TABLET, FILM COATED, EXTENDED RELEASE ORAL at 10:14

## 2018-11-18 RX ADMIN — POLYETHYLENE GLYCOL 3350 SCH GM: 17 POWDER, FOR SOLUTION ORAL at 10:16

## 2018-11-18 NOTE — PN
DATE:  11/18/2018



SUBJECTIVE:  The patient is 85 years old seen and examined, sitting in

chair, seems to be comfortable and her blood pressure did go up this

morning, but by the time I saw the patient, it was better.  No episode of

palpitation, no chest pain, no shortness of breath.



PHYSICAL EXAMINATION

VITAL SIGNS:  The patient is afebrile, pulse 65, respirations 18,blood

pressure 166/81.

LUNGS:  Bilateral fair airflow, no rhonchi or crackle.

HEART:  S1 and S2 audible.

ABDOMEN:  Soft, nontender, no rebound, no guarding.

NEUROLOGIC:  The patient is awake, alert, oriented, communicative,

ambulatory.



LABORATORY EXAMINATION:  WBC 9.3, hemoglobin 12.9, hematocrit 40, platelet

208,000.  Chemistry; sodium 141, potassium 4, chloride 105, CO2 of 25, BUN

29, creatinine 1.1, blood sugar of 106.



ASSESSMENT AND PLAN:

1.  Fluctuating blood pressure.

2.  Renal artery stenosis status post bilateral renal artery angioplasty.

3.  Hyperlipidemia.

4.  Recurrent constipation.

5.  History of uterine cancer.



Plan has been discussed with  _____ Aubrie in great detail.  The patient

has been started on aspirin 81 daily.  She is on verapamil 240 mg daily. 

She is on Catapres 0.1 three times a day.  She is on losartan 100 mg daily.

We will continue current medications, and if her blood pressure remains

stable, we will discharge _____ in a.m.







__________________________________________

Pamela Ram MD





DD:  11/18/2018 14:29:26

DT:  11/18/2018 16:26:09

Job # [id number]

## 2018-11-19 RX ADMIN — SILVER SULFADIAZINE SCH GM: 10 CREAM TOPICAL at 09:47

## 2018-11-19 RX ADMIN — BACITRACIN SCH: 500 OINTMENT TOPICAL at 17:41

## 2018-11-19 RX ADMIN — ENOXAPARIN SODIUM SCH MG: 30 INJECTION SUBCUTANEOUS at 09:47

## 2018-11-19 RX ADMIN — PANTOPRAZOLE SODIUM SCH MG: 40 TABLET, DELAYED RELEASE ORAL at 08:38

## 2018-11-19 RX ADMIN — METOPROLOL SUCCINATE SCH MG: 25 TABLET, EXTENDED RELEASE ORAL at 08:37

## 2018-11-19 RX ADMIN — BACITRACIN SCH APPLIC: 500 OINTMENT TOPICAL at 09:47

## 2018-11-19 RX ADMIN — POTASSIUM CHLORIDE SCH MEQ: 20 TABLET, EXTENDED RELEASE ORAL at 08:38

## 2018-11-19 RX ADMIN — VERAPAMIL HYDROCHLORIDE SCH MG: 240 TABLET, FILM COATED, EXTENDED RELEASE ORAL at 09:46

## 2018-11-19 NOTE — PN
DATE:  11/19/2018



SUBJECTIVE:  The patient is 85-year-old, seen and examined, sitting in

chair, seems to be comfortable.  No nausea, vomiting or diarrhea.



PHYSICAL EXAMINATION:

VITAL SIGNS:  She is afebrile, pulse 70, respirations 16,blood pressure

145/62.

LUNGS:  Bilateral fair airflow.  No rhonchi or crackle.

HEART:  S1 and S2 audible.

ABDOMEN:  Soft, nontender.  No rebound.  No guarding.

NEUROLOGICAL:  The patient is awake, alert, oriented, able to communicate.

EXTREMITIES:  Bilateral leg, no edema.  Complained of constipation.



LABORATORY EXAMINATION:  WBC 9.3, hemoglobin 12.9, hematocrit 40, platelet

208.  Chemistry; sodium 141, potassium 4, chloride 105, CO2 of 25, BUN 29,

creatinine 1.1, blood sugar of 106.



ASSESSMENT AND PLAN:

1.  Uncontrolled blood pressure, seems to be improving.

2.  Bilateral renal artery stenosis status post angioplasty.

3.  Hyperlipidemia.



So the patient has been started on verapamil.  She is on clonidine when

necessary.  She is getting hydrochlorothiazide 25 daily and she is on

losartan 100 mg daily.  The patient is still complaining of generalized

weakness and she is very concerned since she lives alone, she does not want

to go home with unstable blood pressure _____ watching another 24-hour and

possibly discharge.







__________________________________________

Pamela Ram MD



DD:  11/19/2018 12:23:39

DT:  11/19/2018 13:05:06

Job # [id number]

## 2018-11-19 NOTE — PN
DATE:  11/19/2018



SUBJECTIVE:  The patient is seen sitting in chair.  She is eating lunch. 

She is awake.  She is alert.  She is comfortable.  She reports feeling

better.  She denies any palpitations.



PHYSICAL EXAMINATION:

GENERAL:  Elderly lady sitting in chair.

VITAL SIGNS:  Blood pressure 145/62, heart rate 70, respiratory rate 16,

temperature 97.7.

HEENT:  Normocephalic, atraumatic, positive pallor.

NECK:  Supple, no JVD.

LUNGS:  Bilateral equal air entry, bilateral rhonchi, crackles left mid

zone (?).

CARDIAC:  S1 and S2, regular rate and rhythm, no murmur, no rub.

ABDOMEN:  Obese, distended, soft, nontender, bowel sounds present.

EXTREMITIES:  No lower extremity edema.



LABORATORY DATA:  WBC 9, hemoglobin 12.9, hematocrit 40, platelets 208. 

Sodium 141, potassium 4, chloride 105, CO2 25, BUN 29, creatinine 1.1,

glucose 106, calcium 10.3, albumin 4.1, phosphorus 3.7, magnesium 2.4.



CURRENT MEDICATIONS:  Aspirin, bacitracin, Calan 240, Catapres p.r.n.,

Colace, Cozaar 100, chlorthalidone 25, potassium 20 mEq daily, Lovenox,

Plavix, Protonix, Toprol-XL 25.



ASSESSMENT:

1.  Status post hypertensive urgency, bilateral renal artery stenosis,

status post bilateral renal artery stent placement.

2.  Longstanding hypertension.

3.  Stable renal parameters.



PLAN:

1.  Currently blood pressure is adequately controlled.  The patient is on

losartan 100, Calan 240, Toprol-XL 25, chlorthalidone 25.  Continue this

regimen.

2.  No objection to discharge as per the renal standpoint.

3.  We will need outpatient followup.





__________________________________________

Katiuska Alfred MD



DD:  11/19/2018 13:40:59

DT:  11/19/2018 13:45:06

Job # [id number]

## 2018-11-20 VITALS
TEMPERATURE: 98 F | DIASTOLIC BLOOD PRESSURE: 89 MMHG | OXYGEN SATURATION: 96 % | SYSTOLIC BLOOD PRESSURE: 185 MMHG | HEART RATE: 62 BPM

## 2018-11-20 VITALS — RESPIRATION RATE: 19 BRPM

## 2018-11-20 RX ADMIN — BACITRACIN SCH APPLIC: 500 OINTMENT TOPICAL at 10:33

## 2018-11-20 RX ADMIN — POTASSIUM CHLORIDE SCH MEQ: 20 TABLET, EXTENDED RELEASE ORAL at 08:59

## 2018-11-20 RX ADMIN — VERAPAMIL HYDROCHLORIDE SCH MG: 240 TABLET, FILM COATED, EXTENDED RELEASE ORAL at 10:33

## 2018-11-20 RX ADMIN — PANTOPRAZOLE SODIUM SCH MG: 40 TABLET, DELAYED RELEASE ORAL at 09:00

## 2018-11-20 RX ADMIN — METOPROLOL SUCCINATE SCH MG: 25 TABLET, EXTENDED RELEASE ORAL at 08:59

## 2018-11-20 NOTE — PN
DATE:  11/20/2018



SUBJECTIVE:  The patient is currently seen sitting in a chair.  She is

dressed ready to go home.  She is awaiting a wheelchair and her

prescriptions.  The patient is status post bilateral renal artery stent

placement for bilateral renal artery stenosis.  Unfortunately, her systolic

blood pressures were running in the 170s to 180 range.  She is completely

asymptomatic.



MEDICATIONS:  Medication list reviewed.  The patient is currently on

aspirin, bacitracin, Calan, clonidine, Colace, Cozaar, Hygroton, K-Tabs,

Plavix, Protonix, Silvadene and Toprol.



OBJECTIVE:

INTAKE/OUTPUT:  Intake is 960, output is not charted.

VITAL SIGNS:  Blood pressure currently is 185/89, pulse of 62, temperature

98 with a respiratory rate of 19, pulse ox 96%.

HEENT:  Shows her to be normocephalic, atraumatic.  Conjunctivae are pink. 

Sclerae are nonicteric.

NECK:  Supple.  No neck vein distention.

CHEST:  Clear to auscultation and percussion.  No rales, rhonchi or

wheezing.

CARDIOVASCULAR:  Shows a regular rate and rhythm with MR/AI.

ABDOMEN:  Soft.  Bowel sounds normal.

EXTREMITIES:  Show no cyanosis, clubbing or edema.



LABORATORY DATA:  No recent CBC.  No recent chemistries.  Her last BUN was

29 with a creatinine of 1.1.



RADIOLOGY:  Bilateral renal artery stenosis seen on abdominal MRI with MRA.



ASSESSMENT:  Status post hypertensive urgency.  However, the patient still

remains hypertensive with systolic blood pressures in 170-180 range.  I

discussed with her and her daughter the possibility if her blood pressure

does not improve over the next 1-2 weeks, substituting short-acting, less

potent losartan for perhaps Edarbi or Benicar.  This might improve her

blood pressure control together with the verapamil, clonidine, Hygroton and

metoprolol.  Renal artery stenosis status post bilateral renal artery stent

placement.  History of concentric left ventricular hypertrophy with mitral

regurgitation/AI.  Past history of uterine cancer.  History of mild anemia.

Hemoglobin is stable in the 12-13 range.  Status post resection of benign

thyroid tumor with normal thyroid function tests.



PLAN:

1.  Discussed with the patient and her daughter.  I told them we are more

than willing to see her in the outpatient setting if her blood pressure

remains difficult to control, and we are more than willing to adjust her

blood pressure medication to help bring her blood pressure down to

acceptable range.

2.  For right now, she may continue present medications with the

possibility of switching to a longer-acting, more potent angiotensin

receptor blocker.

3.  The patient will make an appointment to see Dr. Carrasquillo in the next 1 week

and if her blood pressure remains uncontrolled, she will follow up with

either Dr. Alfred or myself.





__________________________________________

Boni Davis MD



DD:  11/20/2018 14:48:31

DT:  11/20/2018 14:52:59

Job # [id number]

## 2018-11-20 NOTE — DS
HISTORY OF PRESENT ILLNESS:  The patient is 85 years old who came to

emergency room, not feeling well.  She was shaking.  She was having chest

discomfort.  Her blood pressure was found around 200.  The patient was

initially in observation to control her blood pressure, but further workup

done and showed bilateral renal artery stenosis, so she had bilateral renal

angioplasty done.  Her blood pressure still has been fluctuating.  A

Nephrology consult was called and different medications have been adjusted.

The patient lives alone.  She was uncomfortable going home with the

fluctuating blood pressure, so today the patient was seen and examined,

doing well.



PHYSICAL EXAMINATION:

GENERAL:  On examination today, she is awake, alert, oriented,

communicative, and doing well.

VITAL SIGNS:  The patient is afebrile.  Pulse 62, respirations 18, and

blood pressure 141/86.

LUNGS:  Bilateral good airflow.  No rhonchi or crackle.

HEART:  S1 and S2 audible.

ABDOMEN:  Soft and nontender.  No rebound.  No guarding.

NEUROLOGICAL:  The patient is awake, alert, oriented, and communicative.



DIAGNOSTIC DATA:  She had bilateral leg Doppler study done.  There is no

sonographic evidence of DVT.



ASSESSMENT:

1.  Fluctuating blood pressure, probably secondary to bilateral renal

artery stenosis.

2.  Hypertension.

3.  Hyperlipidemia.



PLAN:  The patient is going to be discharged home today.  She will resume

her verapamil 240 mg in a.m.  She will take chlorthalidone 25 in the

morning.  She will be given Cozaar 100 mg at dinner time and clonidine 0.2

mg at bedtime.  She will be monitored by her PMD, and she is also advised

to follow up with Dr. Alfred and her PMD, Dr. Carrasquillo as outpatient to adjust

her medication further.







__________________________________________

Pamela Ram MD





DD:  11/20/2018 12:14:44

DT:  11/20/2018 14:53:53

Job # [id number]

## 2018-11-27 ENCOUNTER — HOSPITAL ENCOUNTER (INPATIENT)
Dept: HOSPITAL 42 - ED | Age: 83
LOS: 10 days | Discharge: HOME | DRG: 683 | End: 2018-12-07
Attending: INTERNAL MEDICINE | Admitting: INTERNAL MEDICINE
Payer: MEDICARE

## 2018-11-27 VITALS — BODY MASS INDEX: 30.5 KG/M2

## 2018-11-27 DIAGNOSIS — I25.10: ICD-10-CM

## 2018-11-27 DIAGNOSIS — E26.9: ICD-10-CM

## 2018-11-27 DIAGNOSIS — I70.1: ICD-10-CM

## 2018-11-27 DIAGNOSIS — N18.3: ICD-10-CM

## 2018-11-27 DIAGNOSIS — Z87.891: ICD-10-CM

## 2018-11-27 DIAGNOSIS — I95.9: ICD-10-CM

## 2018-11-27 DIAGNOSIS — R07.89: ICD-10-CM

## 2018-11-27 DIAGNOSIS — R00.2: ICD-10-CM

## 2018-11-27 DIAGNOSIS — Z85.42: ICD-10-CM

## 2018-11-27 DIAGNOSIS — I12.9: Primary | ICD-10-CM

## 2018-11-27 DIAGNOSIS — Z79.899: ICD-10-CM

## 2018-11-27 DIAGNOSIS — I08.3: ICD-10-CM

## 2018-11-27 DIAGNOSIS — E89.0: ICD-10-CM

## 2018-11-27 DIAGNOSIS — Z88.5: ICD-10-CM

## 2018-11-27 DIAGNOSIS — R55: ICD-10-CM

## 2018-11-27 DIAGNOSIS — N17.9: ICD-10-CM

## 2018-11-27 DIAGNOSIS — K21.9: ICD-10-CM

## 2018-11-27 DIAGNOSIS — K29.70: ICD-10-CM

## 2018-11-27 DIAGNOSIS — E78.5: ICD-10-CM

## 2018-11-27 DIAGNOSIS — K59.09: ICD-10-CM

## 2018-11-27 DIAGNOSIS — E86.0: ICD-10-CM

## 2018-11-27 DIAGNOSIS — Z95.5: ICD-10-CM

## 2018-11-27 DIAGNOSIS — E87.6: ICD-10-CM

## 2018-11-27 DIAGNOSIS — Z87.11: ICD-10-CM

## 2018-11-27 LAB
ALBUMIN SERPL-MCNC: 4.5 G/DL (ref 3–4.8)
ALBUMIN/GLOB SERPL: 1.3 {RATIO} (ref 1.1–1.8)
ALT SERPL-CCNC: 28 U/L (ref 7–56)
APPEARANCE UR: CLEAR
APTT BLD: 25.3 SECONDS (ref 25.1–36.5)
AST SERPL-CCNC: 30 U/L (ref 14–36)
BASOPHILS # BLD AUTO: 0.02 K/MM3 (ref 0–2)
BASOPHILS NFR BLD: 0.2 % (ref 0–3)
BILIRUB UR-MCNC: NEGATIVE MG/DL
BNP SERPL-MCNC: 496 PG/ML (ref 0–450)
BUN SERPL-MCNC: 50 MG/DL (ref 7–21)
CALCIUM SERPL-MCNC: 10.8 MG/DL (ref 8.4–10.5)
COLOR UR: (no result)
EOSINOPHIL # BLD: 0.3 10*3/UL (ref 0–0.7)
EOSINOPHIL NFR BLD: 2.2 % (ref 1.5–5)
ERYTHROCYTE [DISTWIDTH] IN BLOOD BY AUTOMATED COUNT: 13 % (ref 11.5–14.5)
GFR NON-AFRICAN AMERICAN: 24
GLUCOSE UR STRIP-MCNC: NEGATIVE MG/DL
GRANULOCYTES # BLD: 7.22 10*3/UL (ref 1.4–6.5)
GRANULOCYTES NFR BLD: 60 % (ref 50–68)
HGB BLD-MCNC: 13.4 G/DL (ref 12–16)
INR PPP: 1.11
LEUKOCYTE ESTERASE UR-ACNC: NEGATIVE LEU/UL
LYMPHOCYTES # BLD: 3.5 10*3/UL (ref 1.2–3.4)
LYMPHOCYTES NFR BLD AUTO: 28.9 % (ref 22–35)
MCH RBC QN AUTO: 30.6 PG (ref 25–35)
MCHC RBC AUTO-ENTMCNC: 33.5 G/DL (ref 31–37)
MCV RBC AUTO: 91.3 FL (ref 80–105)
MONOCYTES # BLD AUTO: 1.1 10*3/UL (ref 0.1–0.6)
MONOCYTES NFR BLD: 8.7 % (ref 1–6)
PH UR STRIP: 6 [PH] (ref 4.7–8)
PLATELET # BLD: 363 10^3/UL (ref 120–450)
PMV BLD AUTO: 9.2 FL (ref 7–11)
PROT UR STRIP-MCNC: NEGATIVE MG/DL
PROTHROMBIN TIME: 12.7 SECONDS (ref 9.4–12.5)
RBC # BLD AUTO: 4.38 10^6/UL (ref 3.5–6.1)
RBC # UR STRIP: NEGATIVE /UL
SP GR UR STRIP: 1.01 (ref 1–1.03)
T4 FREE SERPL-MCNC: 1.6 NG/DL (ref 0.78–2.19)
TROPONIN I SERPL-MCNC: 0.02 NG/ML
UROBILINOGEN UR STRIP-ACNC: 0.2 E.U./DL
WBC # BLD AUTO: 12 10^3/UL (ref 4.5–11)

## 2018-11-27 NOTE — HP
DATE OF EXAM:  11/27/2018



HISTORY OF PRESENT ILLNESS:  The patient is an 85-year-old who was recently

discharged after fluctuating _____ blood pressure.  She was found to have

bilateral renal artery stenosis, underwent renal artery angioplasty.  The

patient states she was doing well since she was discharged, but started to

feel very dizzy, lightheaded, and started to have palpitation that started

an hour prior to coming to the hospital and because of palpitation, she

felt as if she is going to pass out, but really did not lose any

consciousness.  No nausea or vomiting.  No diarrhea.  No history of

abdominal pain.



PAST MEDICAL HISTORY:  Significant for:

1.  Hypertension.

2.  Hyperlipidemia.

3.  Renal artery stenosis, recent angioplasty.



SOCIAL HISTORY:  She used to be a heavy smoker in the past, but quit a

couple of years ago.  Socially drinks.



MEDICATIONS AT HOME:  She is on verapamil 240 mg daily.  She is on losartan

100 mg daily, Lasix 40 mg daily, chlorthalidone 25 mg daily, and clonidine

0.2 at bedtime.



ALLERGIES:  SHE IS ALLERGIC TO CODEINE AND AVELOX.



PHYSICAL EXAMINATION:

GENERAL:  She is awake,alert, oriented, and communicative.

VITAL SIGNS:  She is afebrile, pulse 72, respirations 20, and blood

pressure 138/59.

LUNGS:  Bilateral fair airflow.  No rhonchi or crackles.

HEART:  S1 and S2 audible.

ABDOMEN:  Soft and nontender.  No rebound.  No guarding.

NEUROLOGIC:  The patient is awake, alert, oriented, and communicative.



LABORATORY DATA:  WBC 12, hemoglobin 13.4, hematocrit 40, and platelet

_____.  PT 12.7.  INR 1.11.  Chemistry; sodium 138, potassium 2.8, chloride

94, CO2 of 32, BUN 50, and creatinine 2.  Blood sugar 130.  Calcium 10.8. 

.  TSH is 4.83.



ASSESSMENT:

1.  Near syncope.

2.  Uncontrolled hypertension.

3.  Hypokalemia.

4.  Hypothyroidism.

5.  Hypertension.

6.  Renal artery angioplasty.



PLAN:  The patient will be admitted on remote tele.  We will supplement

potassium.  We will resume her medications.  Cardiology consult by Dr. Mcgraw

and Nephrology consult by Dr. Alfred has been requested.  We will followup

electrolyte in a.m.







__________________________________________

Pamela Ram MD





DD:  11/27/2018 19:28:48

DT:  11/27/2018 21:22:17

[facility] # [id number]

## 2018-11-27 NOTE — ED PDOC
Arrival/HPI





- General


Chief Complaint: Chest Pain


Historian: Patient





- History of Present Illness


Narrative History of Present Illness (Text): 





11/27/18 15:28


86 y/o female, pmh including htn (on diuretics)with renal artery stenosis/hld, 

allergic to codeine and floroquinolones, c/o palpitation x 1 hour.  Pt. stated 

that she recently had renal artery stenosis procedure done about 2 weeks ago due

as she has uncontrolled HTN history, recently the BP is better and with new 

blood pressure BP medication yesterday.  Pt. stated that she was having 

palpitation about 1 hour ago with feeling like she was about to pass out, admits

fatigue, no numbness or tingling, no weakness, no diarrhea, no abdominal pain, 

no other medical or psychological complaints. 








Past Medical History





- Provider Review


Nursing Documentation Reviewed: Yes





- Infectious Disease


Hx of Infectious Diseases: None





- Cardiac


Hx Angina: Yes


Hx Hypertension: Yes


Other/Comment: Stents





- Pulmonary


Hx Bronchitis: Yes


Hx Pneumonia: Yes





- Neurological


Hx Dizziness: Yes





- HEENT


Hx Cataracts: Yes (OU repair)





- Renal


Hx Renal Disorder: No





- Endocrine/Metabolic


Hx Hypothyroidism: Yes (thyroidectomy)





- Hematological/Oncological


Hx Cancer: Yes (hx of uterine ca)





- Musculoskeletal/Rheumatological


Hx Musculoskeletal Disorders: Yes


Hx Falls: No


Other/Comment: rt rotator cuff sx





- Gastrointestinal


Hx Gastrointestinal Disorders: Yes





- Genitourinary/Gynecological


Other/Comment: hysterectomy





- Psychiatric


Hx Psychophysiologic Disorder: No


Hx Substance Use: No





- Surgical History


Hx Hysterectomy: Yes


Hx Orthopedic Surgery: Yes (rt rotator cuff)


Other/Comment: thyroidectomy





- Anesthesia


Hx Anesthesia: Yes


Hx Anesthesia Reactions: No


Hx Malignant Hyperthermia: No





- Suicidal Assessment


Feels Threatened In Home Enviroment: No





Family/Social History





- Physician Review


Nursing Documentation Reviewed: Yes


Family/Social History: Unknown Family HX


Smoking Status: Former Smoker


Hx Alcohol Use: No


Hx Substance Use: No





Allergies/Home Meds


Allergies/Adverse Reactions: 


Allergies





codeine Allergy (Intermediate, Verified 01/04/17 10:11)


   NAUSEA/VOMITING/VERTIGO


moxifloxacin HCl [From Avelox] Adverse Reaction (Intermediate, Verified 01/04/17

10:11)


   DIFFICULTY WALKING/TREMORS








Home Medications: 


                                    Home Meds











 Medication  Instructions  Recorded  Confirmed


 


Furosemide [Lasix] 40 mg PO DAILY 11/09/18 11/27/18


 


Losartan [Cozaar] 100 mg PO DAILY 11/27/18 11/27/18


 


Verapamil [Calan SR Tab] 300 mg PO DAILY 11/27/18 11/27/18














Review of Systems





- Review of Systems


Constitutional: Fatigue.  absent: Fevers


Eyes: absent: Vision Changes


ENT: absent: Hearing Changes


Respiratory: absent: SOB, Cough


Cardiovascular: Palpitations.  absent: Chest Pain, Edema


Gastrointestinal: absent: Abdominal Pain, Nausea, Vomiting


Musculoskeletal: absent: Arthralgias, Back Pain


Skin: absent: Rash, Pruritis


Neurological: absent: Headache, Dizziness


Endocrine: absent: Diaphoresis, Polyuria


Psychiatric: absent: Anxiety, Depression, Suicidal Ideation





Physical Exam


Vital Signs Reviewed: Yes





Vital Signs











  Temp Pulse Resp BP Pulse Ox


 


 11/27/18 14:26  98.3 F  73  18  138/59 L  96











Temperature: Afebrile


Blood Pressure: Hypotensive


Pulse: Regular


Respiratory Rate: Normal


Appearance: Positive for: Well-Appearing, Non-Toxic, Comfortable


Pain Distress: None


Mental Status: Positive for: Alert and Oriented X 3





- Systems Exam


Head: Present: Atraumatic, Normocephalic


Pupils: Present: PERRL, Other (bilateral vision w/o correction 20/100, rt. 

vision w/o correction 20/70 vs. lt. vision w/o correction 20/100)


Extroacular Muscles: Present: EOMI


Conjunctiva: Present: Normal


Ears: Present: NORMAL TM, Normal Canal.  No: Erythema


Mouth: Present: Moist Mucous Membranes


Neck: Present: Normal Range of Motion


Respiratory/Chest: Present: Clear to Auscultation, Good Air Exchange.  No: 

Respiratory Distress, Accessory Muscle Use, Wheezes, Retracting, Rhonchi


Cardiovascular: Present: Regular Rate and Rhythm, Normal S1, S2.  No: Murmurs


Abdomen: No: Tenderness, Distention, Peritoneal Signs, Rebound, Guarding


Back: Present: Normal Inspection


Upper Extremity: Present: Normal Inspection.  No: Cyanosis, Edema


Lower Extremity: Present: Normal Inspection.  No: Edema


Neurological: Present: GCS=15, CN II-XII Intact, Speech Normal, Motor Func 

Grossly Intact, Gait Normal, Memory Normal, Other (no drift, NIHSS is zero, 

normal finger to nose, normal heel to shin test. )


Skin: Present: Warm, Dry, Normal Color.  No: Rashes


Psychiatric: Present: Alert, Oriented x 3, Normal Insight, Normal Concentration





Medical Decision Making


ED Course and Treatment: 





11/27/18 15:52


-Labs


-Ct head


-CXR


-Labs


-Observe and reassess





11/27/18 16:45


-EKG: SR @ 74 BPM with PAC, LVH, no ST elevation or depression, no T wave 

inversion, compared with previous ekg. 


-CT head: Mild scattered nonspecific white matter changes.  Additional 

incidental findings as above.  No acute intracranial pathology identified.


-CXR: No active disease.


-Labs show no acute findings except wbc 12.0 (afebrile, likely from dehydration)

BUN 50 from 2.9 and creatine 2.0 from 1.0 (likely dehydrated from diuretics), K+

2.8 with normal magnesium level (po 20 and IV 20meq potassium chloride ordered)


-Mg within normal limit


-, EF 65% 11/2018, no pedal edema, no coughing or SOB


-Trop is negative


-UA is negative for UTI


-TSH 4.8 and T4 1.6, subclinical hypothyroidism


-Pt. will need admission for IV hydration and electrolyte correction. 


-Paging Dr. Ram for admission. 





11/27/18 17:14


-I spoke to the pmd Dr. Ram, discussed about the case/labs/radiology 

results, agreed to admit to her service and she would follow up on any pending 

labs/radiology studies. 








- RAD Interpretation


Radiology Orders: 





CT Head: 





Date of service: 11/27/2018





PROCEDURE:  CT HEAD WITHOUT CONTRAST.





HISTORY:


episode of blurry vision





COMPARISON:


Noncontrast head CT performed 1/4/17





TECHNIQUE:


Axial computed tomography images were obtained through the head/brain without 

intravenous contrast.  





Radiation dose:





Total exam DLP = 924.4 mGy-cm.





This CT exam was performed using one or more of the following dose reduction 

techniques: Automated exposure control, adjustment of the mA and/or kV according

to patient size, and/or use of iterative reconstruction technique.





FINDINGS:





HEMORRHAGE:


No intracranial hemorrhage. 





BRAIN:


Diffuse atrophy with prominence of the ventricles and sulci noted. No mass 

effect or edema.  Intracranial atherosclerotic calcifications. 





Scattered periventricular and subcortical white matter hypodensities, which are 

nonspecific, but often seen with chronic microvascular ischemic disease.  

Punctate calcification noted within the right brainstem. 





Please note that MRI with diffusion imaging is more sensitive in the detection 

of acute ischemic event.





VENTRICLES:


No hydrocephalus. 





CALVARIUM:


Unremarkable.





PARANASAL SINUSES:


Mucosal polyp or retention cyst within the left maxillary sinus.





MASTOID AIR CELLS:


Unremarkable as visualized. No inflammatory changes.





OTHER FINDINGS:


None.





IMPRESSION:


Mild scattered nonspecific white matter changes.  Additional incidental findings

as above.  No acute intracranial pathology identified.





 

--------------------------------------------------------------------------------


------------------------------------------------


CXR:








Date of service: 





11/27/2018





HISTORY:


 medical clearance 





COMPARISON:


11/09/2018 





FINDINGS:





LUNGS:


No active pulmonary disease.





PLEURA:


No significant pleural effusion identified, no pneumothorax apparent.





CARDIOVASCULAR:


Aortic calcifications are seen





Mild cardiomegaly no pulmonary vascular congestion. 





OSSEOUS STRUCTURES:


No significant abnormalities.





VISUALIZED UPPER ABDOMEN:


Normal.





OTHER FINDINGS:


None.





IMPRESSION:


No active disease.


: Radiologist





- EKG Interpretation


EKG Interpretation (Text): 





11/27/18 15:54


SR @ 74 BPM with PAC, LVH, no ST elevation or depression, no T wave inversion. 


Interpreted by ED Physician: Yes


Type: 12 lead EKG


Comparison: Com.w/previous EKG





- PA / NP / Resident Statement


MD/DO has reviewed & agrees with the documentation as recorded.





Disposition/Present on Arrival





- Present on Arrival


Any Indicators Present on Arrival: No


History of DVT/PE: No


History of Uncontrolled Diabetes: No


Urinary Catheter: No


History of Decub. Ulcer: No


History Surgical Site Infection Following: None





- Disposition


Have Diagnosis and Disposition been Completed?: Yes


Diagnosis: 


 Dehydration, Hypokalemia, Near syncope, Fatigue





Disposition: HOSPITALIZED


Disposition Time: 16:16


Patient Plan: Admission, Observation, Telemetry


Patient Problems: 


                             Current Active Problems











Problem Status Onset


 


Dehydration Acute 


 


Hypokalemia Acute 


 


Near syncope Acute 











Condition: STABLE

## 2018-11-27 NOTE — CARD
--------------- APPROVED REPORT --------------





Date of service: 11/27/2018



EKG Measurement

Heart [id number]

NH 170P32

[id number]

[id number]

[id number]



<Conclusion>

Sinus rhythm with premature atrial complexes with aberrant conduction

Left ventricular hypertrophy with repolarization abnormality

Possible Lateral infarct, age undetermined

Abnormal ECG

## 2018-11-27 NOTE — RAD
Date of service: 



11/27/2018



HISTORY:

 medical clearance 



COMPARISON:

11/09/2018 



FINDINGS:



LUNGS:

No active pulmonary disease.



PLEURA:

No significant pleural effusion identified, no pneumothorax apparent.



CARDIOVASCULAR:

Aortic calcifications are seen



Mild cardiomegaly no pulmonary vascular congestion. 



OSSEOUS STRUCTURES:

No significant abnormalities.



VISUALIZED UPPER ABDOMEN:

Normal.



OTHER FINDINGS:

None.



IMPRESSION:

No active disease.

## 2018-11-27 NOTE — CT
Date of service: 11/27/2018



PROCEDURE:  CT HEAD WITHOUT CONTRAST.



HISTORY:

episode of blurry vision



COMPARISON:

Noncontrast head CT performed 1/4/17



TECHNIQUE:

Axial computed tomography images were obtained through the head/brain 

without intravenous contrast.  



Radiation dose:



Total exam DLP = 924.4 mGy-cm.



This CT exam was performed using one or more of the following dose 

reduction techniques: Automated exposure control, adjustment of the 

mA and/or kV according to patient size, and/or use of iterative 

reconstruction technique.



FINDINGS:



HEMORRHAGE:

No intracranial hemorrhage. 



BRAIN:

Diffuse atrophy with prominence of the ventricles and sulci noted. No 

mass effect or edema.  Intracranial atherosclerotic calcifications. 



Scattered periventricular and subcortical white matter hypodensities, 

which are nonspecific, but often seen with chronic microvascular 

ischemic disease.  Punctate calcification noted within the right 

brainstem. 



Please note that MRI with diffusion imaging is more sensitive in the 

detection of acute ischemic event.



VENTRICLES:

No hydrocephalus. 



CALVARIUM:

Unremarkable.



PARANASAL SINUSES:

Mucosal polyp or retention cyst within the left maxillary sinus.



MASTOID AIR CELLS:

Unremarkable as visualized. No inflammatory changes.



OTHER FINDINGS:

None.



IMPRESSION:

Mild scattered nonspecific white matter changes.  Additional 

incidental findings as above.  No acute intracranial pathology 

identified.

## 2018-11-28 LAB
ALBUMIN SERPL-MCNC: 3.4 G/DL (ref 3–4.8)
ALBUMIN/GLOB SERPL: 1.2 {RATIO} (ref 1.1–1.8)
ALT SERPL-CCNC: 21 U/L (ref 7–56)
AST SERPL-CCNC: 25 U/L (ref 14–36)
BASOPHILS # BLD AUTO: 0.01 K/MM3 (ref 0–2)
BASOPHILS NFR BLD: 0.1 % (ref 0–3)
BUN SERPL-MCNC: 37 MG/DL (ref 7–21)
CALCIUM SERPL-MCNC: 9.3 MG/DL (ref 8.4–10.5)
EOSINOPHIL # BLD: 0.3 10*3/UL (ref 0–0.7)
EOSINOPHIL NFR BLD: 2.9 % (ref 1.5–5)
ERYTHROCYTE [DISTWIDTH] IN BLOOD BY AUTOMATED COUNT: 13.2 % (ref 11.5–14.5)
GFR NON-AFRICAN AMERICAN: 39
GRANULOCYTES # BLD: 5.21 10*3/UL (ref 1.4–6.5)
GRANULOCYTES NFR BLD: 61 % (ref 50–68)
HGB BLD-MCNC: 10.8 G/DL (ref 12–16)
LYMPHOCYTES # BLD: 2.4 10*3/UL (ref 1.2–3.4)
LYMPHOCYTES NFR BLD AUTO: 28.6 % (ref 22–35)
MCH RBC QN AUTO: 30.1 PG (ref 25–35)
MCHC RBC AUTO-ENTMCNC: 32.7 G/DL (ref 31–37)
MCV RBC AUTO: 91.9 FL (ref 80–105)
MONOCYTES # BLD AUTO: 0.6 10*3/UL (ref 0.1–0.6)
MONOCYTES NFR BLD: 7.4 % (ref 1–6)
PLATELET # BLD: 261 10^3/UL (ref 120–450)
PMV BLD AUTO: 8.5 FL (ref 7–11)
RBC # BLD AUTO: 3.59 10^6/UL (ref 3.5–6.1)
WBC # BLD AUTO: 8.5 10^3/UL (ref 4.5–11)

## 2018-11-28 RX ADMIN — SILVER SULFADIAZINE SCH GM: 10 CREAM TOPICAL at 10:00

## 2018-11-28 RX ADMIN — SILVER SULFADIAZINE SCH: 10 CREAM TOPICAL at 01:43

## 2018-11-28 RX ADMIN — CEFTRIAXONE SCH MLS/HR: 1 INJECTION, SOLUTION INTRAVENOUS at 10:07

## 2018-11-28 RX ADMIN — SILVER SULFADIAZINE SCH GM: 10 CREAM TOPICAL at 17:41

## 2018-11-28 NOTE — CARD
--------------- APPROVED REPORT --------------





Date of service: 11/27/2018



EKG Measurement

Heart [id number]

MD 166P13

[id number]

[id number]

[id number]



<Conclusion>

Sinus rhythm with premature atrial complexes

Left ventricular hypertrophy with repolarization abnormality

Possible Lateral infarct, age undetermined

Abnormal ECG

## 2018-11-28 NOTE — PN
DATE:  11/28/2018



SUBJECTIVE:  Patient is 85 years old, seen and examined.  She states she

feels a little better now, had palpitation episodes prior to coming.  Felt

dizzy but not dizzy anymore.



PHYSICAL EXAMINATION:

VITAL SIGNS:  She is afebrile, pulse 57, respirations 20, blood pressure

154/65.

LUNGS:  Bilateral fair airflow.  No rhonchi or crackle.

HEART:  S1 and S2 audible.

ABDOMEN:  Soft, nontender.  No rebound, no guarding.

NEUROLOGICAL:  Patient is awake, alert, oriented, communicative.



LABORATORY DATA:  WBC 8.5, hemoglobin 10.8, hematocrit 33, platelets 261. 

Chemistry:  Sodium 141, potassium 3.0, chloride 105, CO2 of 29, BUN 37,

creatinine 1.3, blood sugar of _____.  Urinalysis is unremarkable.



ASSESSMENT:

1.  Status post near syncope.

2.  Uncontrolled hypertension.

3.  Palpitation.

4.  Renal insufficiency.



PLAN:  Patient is currently on verapamil.  She is on _____ at bedtime.  She

is on losartan.  Potassium is being supplemented.  She is given empirically

Rocephin, awaiting cultures.  She has been started on levothyroxine. 

Encouraged ambulation.  We will follow up patient in a.m.  Patient needs to

be changed to inpatient admission to monitor her fluctuating blood

pressure.







__________________________________________

Pamela Ram MD





DD:  11/28/2018 12:47:35

DT:  11/28/2018 13:33:55

Job # [id number]

## 2018-11-28 NOTE — CON
DATE:  11/28/2018



SERVICE:  Cardiology.



REASON FOR CONSULTATION AND FOLLOWUP:  Cardiac evaluation, complaining of

palpitation.



BRIEF CLINICAL HISTORY:  This is an 85-year-old female with past medical

history significant for non-obstructive coronary artery disease being

followed by Dr. Thao in the past, history of cardiac catheterization in

1988 and then in 1997, recently admitted with uncontrolled hypertension and

subsequently workup shows renal artery stenosis and the patient underwent

angioplasty by Dr. Zia Gordon _____ stent, then discharged home, now

admitted again with palpitation.  The patient preferred second opinion from

cardiology.  Though she is being followed by Dr. Thao but does not want

to see Dr. Thao and wanted to see us, so cardiac consult was called.  The

patient denies any chest pain, but she complained that after being

discharged, she feels very weak.  Yesterday, the blood pressure dropped to

90, though all the time remains 200 and above and felt palpitation, so came

to the emergency room.  Denies any chest pain.



PAST MEDICAL HISTORY:  Significant for non-obstructive coronary artery

disease, status post cardiac catheterization twice by Dr. Thao group in

1988 and 1997, history of uncontrolled hypertension, hyperlipidemia,

history of recent renal artery stenosis and stent was done and both left

and right renal arteries were stented dated 11/14/2018 by Dr. Gordon.  As

mentioned, more than 90% bilateral renal artery stenosis.  Multiple

medications including clonidine patch, verapamil, losartan and still the

blood pressure remains 200 prior to renal stent.



PREVIOUS CARDIAC WORKUP:  As follows:  The patient had a history of cardiac

catheterization twice and was told that no significant CAD in 1988 and

1997.  History of echo 4-5 months ago at Dr. Thao's office and it was

mentioned that it was okay.  Repeat echo was done on last admission in the

[facility] dated 11/10/2018 that showed normal LV size, normal

LV function, aortic valve mildly calcified, aortic sclerosis, mild aortic

regurgitation, mild tricuspid regurgitation, trace mitral regurgitation

noted, calculated ejection fraction reported 65% and RVSP 42 mmHg.



FAMILY HISTORY:  Nothing contributory.



SOCIAL HISTORY:  Denies smoking now, quit 30-40 years ago.  Ex-smoker in

the past.  Denies any history of alcohol abuse.



CURRENT MEDICATIONS:  Prior to coming here, the patient was taking

clonidine patch number 2 every week, verapamil 300 mg daily, losartan 100

mg daily, Lasix 40 mg daily.



ALLERGIES:  CODEINE, NAUSEA AND VERTIGO AND ALLERGY TO AVELOX, GETS TREMOR

AND DIFFICULTY IN WALKING.



REVIEW OF SYSTEMS:  As per HPI.  Denies any history of chest pain.



PHYSICAL EXAMINATION:

GENERAL:  Height of the patient is 4 feet 10 inches, weight of the patient

is 146 pounds, body mass index 30 kg/m2.

VITAL SIGNS:  Temperature afebrile, heart rate 59, blood pressure 133/60.

HEENT:  PERRLA.  Extraocular muscles intact.

NECK:  Supple.  No carotid bruit or thyromegaly.

CHEST:  Clear to auscultation.

HEART:  S1, S2 regular.

ABDOMEN:  Soft.

EXTREMITIES:  Clubbing and cyanosis negative.



LABORATORY DATA:  WBC 8.5, hemoglobin 10.8, hematocrit 33.0, platelet count

261.  Chemistry shows sodium 140, potassium 3, chloride 105, carbon dioxide

20, anion gap of 10, BUN 37, creatinine 1.3.



IMPRESSION:  An 85-year-old female with past medical history significant

for non-obstructive coronary artery disease, history of uncontrolled

hypertension, bilateral renal artery stenosis, status post stent on

11/14/2018, admitted with low blood pressure, severe hypokalemia and

palpitation.



RECOMMENDATION:  We will try to cut down the medication because the patient

has recently renal artery stenosis stented.  Monitor closely.  Cautious

hydration and also supplement aggressively potassium as needed.  Recent

echo shows preserved LV function, trace MR, trace AR.  We will follow

closely. Consider stress test as outpatient for risk stratification in 4 to

6 weeks.  Discussed at length with the patient.  We will put low-dose

beta-blocker if tolerated or heart rate is stable.  For now, we will hold

clonidine.  The patient is already on verapamil.  Will put some holding

parameters and also hold clonidine for now and readjust medication.  We

will follow with you.



Thank you  _____ for providing us the opportunity in taking care of the

patient, Lynda Abdul.





__________________________________________

Mary Mcgraw MD









DD:  11/28/2018 10:12:02

DT:  11/28/2018 12:43:24

Job # [id number]

## 2018-11-29 LAB
ALBUMIN SERPL-MCNC: 3.4 G/DL (ref 3–4.8)
ALBUMIN/GLOB SERPL: 1.2 {RATIO} (ref 1.1–1.8)
ALT SERPL-CCNC: 20 U/L (ref 7–56)
AST SERPL-CCNC: 22 U/L (ref 14–36)
BASOPHILS # BLD AUTO: 0.01 K/MM3 (ref 0–2)
BASOPHILS NFR BLD: 0.1 % (ref 0–3)
BUN SERPL-MCNC: 29 MG/DL (ref 7–21)
CALCIUM SERPL-MCNC: 9.5 MG/DL (ref 8.4–10.5)
EOSINOPHIL # BLD: 0.4 10*3/UL (ref 0–0.7)
EOSINOPHIL NFR BLD: 4.3 % (ref 1.5–5)
ERYTHROCYTE [DISTWIDTH] IN BLOOD BY AUTOMATED COUNT: 13.7 % (ref 11.5–14.5)
GFR NON-AFRICAN AMERICAN: 47
GRANULOCYTES # BLD: 4.54 10*3/UL (ref 1.4–6.5)
GRANULOCYTES NFR BLD: 52.9 % (ref 50–68)
HDLC SERPL-MCNC: 45 MG/DL (ref 29–60)
HGB BLD-MCNC: 10.9 G/DL (ref 12–16)
LDLC SERPL-MCNC: 106 MG/DL (ref 0–129)
LYMPHOCYTES # BLD: 2.9 10*3/UL (ref 1.2–3.4)
LYMPHOCYTES NFR BLD AUTO: 33.6 % (ref 22–35)
MCH RBC QN AUTO: 29.5 PG (ref 25–35)
MCHC RBC AUTO-ENTMCNC: 31.4 G/DL (ref 31–37)
MCV RBC AUTO: 94 FL (ref 80–105)
MONOCYTES # BLD AUTO: 0.8 10*3/UL (ref 0.1–0.6)
MONOCYTES NFR BLD: 9.1 % (ref 1–6)
PLATELET # BLD: 238 10^3/UL (ref 120–450)
PMV BLD AUTO: 8.8 FL (ref 7–11)
RBC # BLD AUTO: 3.69 10^6/UL (ref 3.5–6.1)
WBC # BLD AUTO: 8.6 10^3/UL (ref 4.5–11)

## 2018-11-29 RX ADMIN — CEFTRIAXONE SCH MLS/HR: 1 INJECTION, SOLUTION INTRAVENOUS at 09:05

## 2018-11-29 RX ADMIN — VERAPAMIL HYDROCHLORIDE SCH MG: 240 TABLET, FILM COATED, EXTENDED RELEASE ORAL at 09:04

## 2018-11-29 RX ADMIN — SILVER SULFADIAZINE SCH: 10 CREAM TOPICAL at 11:04

## 2018-11-29 RX ADMIN — SILVER SULFADIAZINE SCH GM: 10 CREAM TOPICAL at 17:10

## 2018-11-29 NOTE — PN
DATE:  11/29/2018



REASON FOR CONSULTATION:  Cardiac evaluation, complaining of palpitation,

uncontrolled hypertension, history of bilateral renal stenting.



SUBJECTIVE:  The patient denies any chest pain, but complains of

palpitation.  On telemetry, no arrhythmia noted, but the patient is still

complaining of palpitations and headache as well.  Yesterday, the

patient's blood pressure at one point decreased to  around 90 to 100. 

So, antihypertensive medication was held.  Today's blood pressure is going

up again.  Already, the patient is on  verapamil 240 mg

daily, spironolactone 25 by mouth two times a day, and clonidine, losartan

as well.  I will put hydralazine as needed as well.  We will get a Holter

monitor to R/o arrhythmia because the patient complained of,

but nothing detected in the telemetry.



This note is in addition to dictated by the nurse practitioner, Jennifer Moreno.  We will schedule a stress test as outpatient in three to four

weeks.  The patient had a recent echocardiography done dated 11/10/2018,

two weeks ago, that showed normal LV size and normal LV function, calcified

aortic valve, mild aortic stenosis, mild aortic regurgitation, mild

tricuspid regurgitation, trace mitral regurgitation, LV systolic pressure

of 42.  We will follow with you.  Further recommendations depending on the

hospital course and follow up of the Holter monitor.  The patient's

potassium is 3.7, started on spironolactone.



Thank you Dr. Ram for providing us the opportunity in taking care of

the patient, Lynda Abdul.







__________________________________________

Mary Mcgraw MD





DD:  11/29/2018 12:54:25

DT:  11/29/2018 13:44:07

Job # [id number]

KANCHAN

## 2018-11-29 NOTE — PN
DATE:  11/29/2018



SUBJECTIVE:  The patient is an 85-year-old, seen and examined, had episode

of dizziness and palpitation this morning, high blood pressure shot up to

196/80 and pulse was 76; otherwise, she feels fine now.



PHYSICAL EXAMINATION:

VITAL SIGNS:  She is afebrile, pulse 74, respirations 20, and blood

pressure at 9 o'clock was 196/80.

LUNGS:  Bilateral fair airflow.  No rhonchi or crackle.

HEART:  S1 and S2 audible.

ABDOMEN:  Soft and nontender.  No rebound.  No guarding.

NEUROLOGICAL:  The patient is awake and alert, able to communicate.



LABORATORY DATA:  WBC 8.6, hemoglobin 10.9, hematocrit 34.7, and platelets

of 238.  Chemistry:  Sodium 141, potassium 3.7, chloride 108, CO2 of 29,

BUN 29, creatinine 1.1, and blood sugar of 101.  TSH is 4.83.



ASSESSMENT:

1.  Fluctuating high blood pressure.

2.  Renal artery stenosis, status post angioplasty.

3.  Hypertension.

4.  Hypokalemia.



PLAN:  The patient is currently on spironolactone.  She is on verapamil,

clonidine 0.2 at bedtime, losartan 100 mg daily, and potassium is being

supplemented.  Renal ultrasound has been ordered.  Holter monitor has been

ordered.  We will give her one dose of Plavix stat and we will follow up

the patient in a.m.







__________________________________________

Pamela Ram MD





DD:  11/29/2018 16:15:58

DT:  11/29/2018 17:33:47

Job # [id number]

## 2018-11-29 NOTE — PN
DATE:  11/29/2018



SUBJECTIVE:  The patient is seen lying in bed.  She reports that this

morning she was sitting in chair and suddenly she felt like pounding

headache, palpitations, and then her pressure was found to be 200 in the

right arm and 196 in the left arm.  She still reports some residual

headache.  She denies any chest pain.  She denies any palpitations at

present.



PHYSICAL EXAMINATION:

GENERAL:  Elderly lady lying in bed.

VITAL SIGNS:  Blood pressure 196/80 at 9 a.m. today.  Heart rate 76,

respiratory rate 20, temperature 97.7.

HEENT:  Normocephalic and atraumatic.  Positive pallor.

NECK:  Supple.  No JVD.

LUNGS:  Bilateral equal air entry.  Bilateral equal expansion.

EXTREMITIES:  No lower extremity edema.



LABORATORY DATA:  WBC 8.6, hemoglobin 10.9, hematocrit 34.7, and platelets

238.  Sodium 141, potassium 3.7, chloride 108, CO2 of 29, BUN 29,

creatinine 1.1, glucose 101, calcium 9.5, phosphorus 2.6, magnesium 1.9,

albumin 3.4, and A1c 5.5.



CURRENT MEDICATIONS:  Calan 240 mg, Catapres 0.2 mg, Cozaar 100 mg,

Rocephin 1 g, Synthroid 25 mcg, and Tylenol.



ASSESSMENT:

1.  Severe uncontrolled hypertension.  Blood pressure goes up and down. 

The patient described an episode where she was sitting in chair and

suddenly the pressure went up, episodic hypertension?  Hyperaldosteronism?

2.  Recent renal artery stenting.  Case discussed with Dr. Zia Gordon. 

?Stent occlusion.

3.  Acute kidney injury, likely secondary to dehydration, now resolved.

4.  Hypokalemia secondary to multiple diuretics plus ?hyperaldosteronism.



PLAN:

1.  Replace potassium.

2.  Start Aldactone 25 mg b.i.d.

3.  Renal artery Dopplers to rule out stent occlusion.





__________________________________________

Katiuska Alfred MD





DD:  11/29/2018 15:51:47

DT:  11/29/2018 15:54:30

Job # [id number]

## 2018-11-29 NOTE — CP.PCM.PN
Subjective





- Date & Time of Evaluation


Date of Evaluation: 11/29/18


Time of Evaluation: 07:40





- Subjective


Subjective: 








Awake, alert, no distress,feels good today





Reason for consultation and follow up: Cardiac evaluation of palpitation and 

hypokalemia. History of hypertension, renal artery stenosis





Seen and examined by me and Dr. Mcgraw

















Objective





- Vital Signs/Intake and Output


Vital Signs (last 24 hours): 


                                        











Temp Pulse Resp BP Pulse Ox


 


 97.7 F   76   20   196/80 H  98 


 


 11/29/18 06:00  11/29/18 09:04  11/29/18 06:00  11/29/18 09:04  11/29/18 06:00











- Medications


Medications: 


                               Current Medications





Acetaminophen (Tylenol 325mg Tab)  650 mg PO Q6H PRN


   PRN Reason: Fever >100.4 F


Clonidine HCl (Catapres)  0.2 mg PO HS [facility]


   Last Admin: 11/28/18 21:19 Dose:  0.2 mg


Ceftriaxone Sodium (Rocephin 1 Gram Ivpb)  1 gm in 100 mls @ 100 mls/hr IVPB 

DAILY [facility]; Protocol


   Last Admin: 11/29/18 09:05 Dose:  100 mls/hr


Levothyroxine Sodium (Synthroid)  25 mcg PO 0600 [facility]


   Last Admin: 11/29/18 05:57 Dose:  25 mcg


Losartan Potassium (Cozaar)  100 mg PO DAILY [facility]


   Last Admin: 11/29/18 09:05 Dose:  100 mg


Silver Sulfadiazine (Silvadene 1% 25 Gm)  0 gm TP BID [facility]


   Last Admin: 11/28/18 17:41 Dose:  25 gm


Verapamil HCl (Calan Sr Tab)  240 mg PO DAILY [facility]


   Last Admin: 11/29/18 09:04 Dose:  240 mg











- Labs


Labs: 


                                        





                                 11/29/18 05:45 





                                 11/29/18 05:45 





                                        











PT  12.7 SECONDS (9.4-12.5)  H  11/27/18  16:00    


 


INR  1.11   11/27/18  16:00    


 


APTT  25.3 Seconds (25.1-36.5)   11/27/18  16:00    














- Constitutional


Appears: Non-toxic, No Acute Distress





- Head Exam


Head Exam: NORMAL INSPECTION, NORMOCEPHALIC





- Eye Exam


Eye Exam: Normal appearance


Pupil Exam: NORMAL ACCOMODATION





- ENT Exam


ENT Exam: Mucous Membranes Moist, Normal Exam





- Respiratory Exam


Respiratory Exam: Clear to Ausculation Bilateral, NORMAL BREATHING PATTERN





- Cardiovascular Exam


Cardiovascular Exam: +S1, +S2





- GI/Abdominal Exam


GI & Abdominal Exam: Soft, Normal Bowel Sounds





- Extremities Exam


Extremities Exam: Full ROM


Additional comments: 





1+ edema more on right leg than left





- Neurological Exam


Neurological Exam: Alert, Awake, Oriented x3





- Psychiatric Exam


Psychiatric exam: Normal Affect, Normal Mood





- Skin


Skin Exam: Dry, Normal Color, Warm





Assessment and Plan





- Assessment and Plan (Free Text)


Assessment: 








An 85 year old female who came in to the ER due to palpitations. History of 

uncontrolled hypertension,non obstructive coronary artery disease, cardiac cath 

was done in 1988 and 1997, hyperlipidemia, recent renal artery stenosis with 

bilateral  stent placed last 11/14/18. Recent echo 11/10/18 showed  normal LV 

size, normal LV function. Admitted for hypotension and severe hypokalemia.








Plan: 








Feels better today


Denies palpitation or chest pain


On IV fluids for hydration


Replenished potassium as needed,


K-3.7 today, will give 20 meq Kdur


Heart rate controlled


Blood pressure uncontrolled


Recheck after morning antihypertensive, if still high will readjust medications.


On Clonidine 0.2 mg daily, Synthoid 25 mcg daily,


 Cozaar 100 mg daily, Verapamil 240 mg daily


Continue current medications


Continue current treatment


Symptoms clinically improved


Discharged planning


Chart reviewed


Will follow up





Plan and treatment discussed with Dr. Mcgraw

## 2018-11-30 LAB
BUN SERPL-MCNC: 21 MG/DL (ref 7–21)
CALCIUM SERPL-MCNC: 10 MG/DL (ref 8.4–10.5)
ERYTHROCYTE [DISTWIDTH] IN BLOOD BY AUTOMATED COUNT: 13.6 % (ref 11.5–14.5)
GFR NON-AFRICAN AMERICAN: 53
HGB BLD-MCNC: 11.5 G/DL (ref 12–16)
MCH RBC QN AUTO: 29.6 PG (ref 25–35)
MCHC RBC AUTO-ENTMCNC: 31.7 G/DL (ref 31–37)
MCV RBC AUTO: 93.6 FL (ref 80–105)
PLATELET # BLD: 248 10^3/UL (ref 120–450)
PMV BLD AUTO: 8.9 FL (ref 7–11)
RBC # BLD AUTO: 3.88 10^6/UL (ref 3.5–6.1)
WBC # BLD AUTO: 9.2 10^3/UL (ref 4.5–11)

## 2018-11-30 RX ADMIN — SILVER SULFADIAZINE SCH: 10 CREAM TOPICAL at 14:42

## 2018-11-30 RX ADMIN — SILVER SULFADIAZINE SCH: 10 CREAM TOPICAL at 17:41

## 2018-11-30 RX ADMIN — VERAPAMIL HYDROCHLORIDE SCH MG: 240 TABLET, FILM COATED, EXTENDED RELEASE ORAL at 10:31

## 2018-11-30 NOTE — PN
DATE:  11/30/2018



SUBJECTIVE:  The patient is seen lying in bed.  She is somewhat anxious. 

She reports she is feeling like she has palpitations.  Although her heart

rate is 69.  She also complains of feeling lightheaded.  She feels more of

the symptoms when she sits down.  Her heart rate remains normal, but her

blood pressure was high this morning at 185/70.



PHYSICAL EXAMINATION:

GENERAL:  An elderly lady lying in bed.

VITAL SIGNS:  Blood pressure 185/70 at 6:00 a.m., heart rate 63,

respiratory rate 20, temperature 98.

HEENT:  Normocephalic, atraumatic, positive pallor.

NECK:  Supple, no JVD.

LUNGS:  Bilateral equal entry, bilateral equal expansion.

CARDIAC:  S1, S2, regular rate and rhythm.  No murmur, no rub.

ABDOMEN:  Obese, distended, soft, nontender.  Bowel sounds present.

EXTREMITIES:  Trace lower extremity edema.

Intake and output 2800/not charted.



LABORATORY DATA:  WBC 9.2, hemoglobin is 11.5, hematocrit 36, platelets

248.  Sodium 140, potassium 3.9, chloride of 106, CO2 of 29, BUN 21,

creatinine 1.0, glucose 106.  Calcium 10.0, phosphorus 2.7.



CURRENT MEDICATIONS:  Aldactone 25 twice a day, Apresoline as needed, Calan

240, Catapres 0.2, Cozaar 100, Ecotrin, Xanax, Rocephin discontinued.



ASSESSMENT:

1.  Severe uncontrolled hypertension.

2.  Acute kidney injury secondary to dehydration.

3.  Hypokalemia, resolved.

4.  Bilateral renal artery stenosis, status post bilateral renal artery

stents.

5.  Palpitations.



PLAN:

1.  Follow up renal artery Dopplers to ensure patent renal artery stents.

2.  Continue Aldactone 25 twice a day.

3.  Follow up plasma metanephrines.

4.  Continue low-dose aspirin, although there is no evidence for

anticoagulation after peripheral stents as per Dr. Gordon and my discussion.







__________________________________________

Katiuska Alfred MD





DD:  11/30/2018 14:08:20

DT:  11/30/2018 14:11:51

Job # [id number]

## 2018-11-30 NOTE — CP.PCM.PN
Subjective





- Date & Time of Evaluation


Date of Evaluation: 11/30/18


Time of Evaluation: 06:25





- Subjective


Subjective: 








Lying in bed, awake, alert, no distress





Reason for consultation and follow up: Cardiac evaluation of palpitation and 

hypokalemia. History of hypertension, renal artery stenosis





Seen and examined by me and Dr. Mcgraw








Objective





- Vital Signs/Intake and Output


Vital Signs (last 24 hours): 


                                        











Temp Pulse Resp BP Pulse Ox


 


 98.3 F   45 L  20   166/62 H  97 


 


 11/29/18 18:39  11/30/18 02:00  11/29/18 18:39  11/30/18 00:45  11/29/18 18:39











- Medications


Medications: 


                               Current Medications





Acetaminophen (Tylenol 325mg Tab)  650 mg PO Q6H PRN


   PRN Reason: Fever >100.4 F


Aspirin (Ecotrin)  81 mg PO DAILY [facility]


   Last Admin: 11/29/18 17:09 Dose:  81 mg


Clonidine HCl (Catapres)  0.2 mg PO HS [facility]


   Last Admin: 11/29/18 21:47 Dose:  0.2 mg


Hydralazine HCl (Apresoline)  10 mg PO QID PRN


   PRN Reason: for sbo>170


   Last Admin: 11/29/18 17:07 Dose:  10 mg


Levothyroxine Sodium (Synthroid)  25 mcg PO 0600 [facility]


   Last Admin: 11/29/18 05:57 Dose:  25 mcg


Losartan Potassium (Cozaar)  100 mg PO DAILY [facility]


   Last Admin: 11/29/18 09:05 Dose:  100 mg


Silver Sulfadiazine (Silvadene 1% 25 Gm)  0 gm TP BID [facility]


   Last Admin: 11/29/18 17:10 Dose:  25 gm


Spironolactone (Aldactone)  25 mg PO BID [facility]


   Last Admin: 11/29/18 17:07 Dose:  25 mg


Verapamil HCl (Calan Sr Tab)  240 mg PO DAILY [facility]


   Last Admin: 11/29/18 09:04 Dose:  240 mg











- Labs


Labs: 


                                        





                                 11/29/18 05:45 





                                 11/29/18 05:45 





                                        











PT  12.7 SECONDS (9.4-12.5)  H  11/27/18  16:00    


 


INR  1.11   11/27/18  16:00    


 


APTT  25.3 Seconds (25.1-36.5)   11/27/18  16:00    














- Constitutional


Appears: Non-toxic, No Acute Distress





- Head Exam


Head Exam: NORMAL INSPECTION, NORMOCEPHALIC





- Eye Exam


Eye Exam: Normal appearance


Pupil Exam: NORMAL ACCOMODATION





- ENT Exam


ENT Exam: Mucous Membranes Moist, Normal Exam





- Respiratory Exam


Respiratory Exam: Clear to Ausculation Bilateral, NORMAL BREATHING PATTERN





- Cardiovascular Exam


Cardiovascular Exam: +S1, +S2


Additional comments: 





holter monitor





- GI/Abdominal Exam


GI & Abdominal Exam: Soft, Normal Bowel Sounds





- Extremities Exam


Extremities Exam: Full ROM


Additional comments: 





1+edema





- Neurological Exam


Neurological Exam: Alert, Awake, Oriented x3





- Psychiatric Exam


Psychiatric exam: Normal Affect, Normal Mood





- Skin


Skin Exam: Dry, Normal Color, Warm





Assessment and Plan





- Assessment and Plan (Free Text)


Assessment: 








An 85 year old female who came in to the ER due to palpitations. History of 

uncontrolled hypertension,non obstructive coronary artery disease, cardiac cath 

was done in 1988 and 1997, hyperlipidemia, recent renal artery stenosis with 

bilateral  stent placed last 11/14/18. Recent echo 11/10/18 showed  normal LV 

size, normal LV function. Admitted for hypotension and severe 

hypokalemia.Replenished potassium as needed,


Adjusted medications for antihypertension. Still feeling of palpitation inspite 

of correction of hypokalemia,Placed on holter monitor. 








Plan: 








Hypokalemia resolved, still having feeling of palpitations


Placed on 24 hour Holter monitor to rule out arrythmias


Heart rate controlled


Blood pressure uncontrolled (-180's) yesterday and added Hydralazine


Today, better 's


also Aldactone was started yesterday


On Clonidine 0.2 mg daily, Synthoid 25 mcg daily,ASA 81 mg daily


 Cozaar 100 mg daily, Verapamil 240 mg daily, Hydralazine 10 mg QID PRN


 Aldactone 25 mg BID


Continue current medications


Continue current treatment


Will follow up result of Holter Monitor


Out patient stress test


Chart reviewed


Will follow up





Plan and treatment discussed with Dr. Mcgraw

## 2018-11-30 NOTE — PN
DATE:  11/30/2018



SUBJECTIVE:  The patient is 85 years old seen and examined.  Had episode of

palpitation this morning plus lightheaded.  Patient blames that when she

takes Synthroid, she has those symptoms.  Although her TSH is high, but I

will discontinue her Synthroid for now.  Otherwise, she is eating well. 

Complains of constipation.



PHYSICAL EXAMINATION:

VITAL SIGNS:  The patient is afebrile, pulse 63, respirations 20, blood

pressure 185/70.

LUNGS:  Bilateral fair airflow.  No rhonchi or crackle.

HEART:  S1 and S2 audible.

ABDOMEN:  Soft and nontender.  No rebound.  No guarding.

NEUROLOGICAL:  She is awake, alert, oriented, communicative, ambulatory.

EXTREMITIES:  Bilateral legs, no edema.



LABORATORY DATA:  WBC is 9.2, hemoglobin 11.5, hematocrit 36, platelets

248.  Chemistry:  Sodium 140, potassium 3.9, chloride 106, CO2 of 29, BUN

21, creatinine 1.0, blood sugar of 106.



ASSESSMENT:

1.  Status post dehydration and hypokalemia, seems to be doing better.

2.  Fluctuating hypertension.

3.  Bilateral renal artery stenosis status post angioplasty.

4.  Hypertension.

5.  Hyperlipidemia.

6.  Constipation.

7.  Palpitation.



PLAN:  So currently patient has been started on Aldactone.  She is on

verapamil, clonidine 0.2 at bedtime.  I will discontinue her Synthroid. 

Follow up renal artery Doppler.  Follow up the patient in the a.m.







__________________________________________

Pamela Ram MD





DD:  11/30/2018 12:10:41

DT:  11/30/2018 13:10:46

Job # [id number]

## 2018-11-30 NOTE — US
PROCEDURE:  Bilateral renal artery duplex ultrasound.



CLINICAL HISTORY:  Renal artery stenosis.  Recent bilateral renal 

artery stent placement.  Evaluate for patency 



PHYSICIAN(S):  Zia Gordon M.D.



TECHNIQUE:

Duplex sonography with color-flow Doppler was used to evaluate the 

visualized segments of the main renal arteries. The patient was 

evaluated in a fasting state. Imaging in a supine and decubitus 

position was performed. Limited evaluation of the arcuate waveforms 

and resistive indices were performed.



FINDINGS:

Visualization of the stents is somewhat limited due to calcified 

plaque.  The kidneys are normal in size, shape, and location. The 

right kidney measures 11.2cm in length and the left kidney measures 

10.3cm in length. No solid renal masses, abnormal calcifications, or 

hydronephrosis is seen. 



The stent at the right renal artery origin is fairly well visualized. 

 There is some shadowing plaque present.  The stent is patent and the 

peak systolic velocity is 98 cm/second.  This is consistent with a 

0-49 percent stenosis. 



The stent in the left renal artery origin is also patent.  The peak 

systolic velocity within the left renal artery stent is 71 cm/second. 

 This is consistent with a 0-49 percent residual stenosis. 



IMPRESSION:

1. The stents in the renal artery origins bilaterally are patent with 

normal velocities. 



2. The kidneys are normal and symmetric in size. There are no solid 

renal masses, abnormal calcifications or hydronephrosis noted.

## 2018-12-01 RX ADMIN — SILVER SULFADIAZINE SCH GM: 10 CREAM TOPICAL at 17:57

## 2018-12-01 RX ADMIN — VERAPAMIL HYDROCHLORIDE SCH MG: 240 TABLET, FILM COATED, EXTENDED RELEASE ORAL at 09:27

## 2018-12-01 RX ADMIN — SILVER SULFADIAZINE SCH GM: 10 CREAM TOPICAL at 10:00

## 2018-12-01 NOTE — CP.PCM.PN
Subjective





- Date & Time of Evaluation


Date of Evaluation: 12/01/18


Time of Evaluation: 07:30





- Subjective


Subjective: 





Lying in bed, awake, alert, no distress,feels okay,denies palpitation





Reason for consultation and follow up: Cardiac evaluation of palpitation and 

hypokalemia. History of hypertension, renal artery stenosis





Seen and examined by me and Dr. Mcgraw








Objective





- Vital Signs/Intake and Output


Vital Signs (last 24 hours): 


                                        











Temp Pulse Resp BP Pulse Ox


 


 97.9 F   45 L  18   143/66   97 


 


 12/01/18 01:00  12/01/18 05:32  12/01/18 01:00  12/01/18 01:00  12/01/18 01:00








Intake and Output: 


                                        











 12/01/18 12/01/18





 06:59 18:59


 


Intake Total 120 


 


Balance 120 














- Medications


Medications: 


                               Current Medications





Acetaminophen (Tylenol 325mg Tab)  650 mg PO Q6H PRN


   PRN Reason: Fever >100.4 F


Alprazolam (Xanax)  0.25 mg PO HS [facility]; Protocol


   Stop: 12/07/18 22:01


   Last Admin: 11/30/18 22:56 Dose:  Not Given


Aspirin (Ecotrin)  81 mg PO DAILY [facility]


   Last Admin: 11/30/18 10:32 Dose:  81 mg


Clonidine HCl (Catapres)  0.2 mg PO HS [facility]


   Last Admin: 11/30/18 22:49 Dose:  0.2 mg


Hydralazine HCl (Apresoline)  10 mg PO QID PRN


   PRN Reason: for sbo>170


   Last Admin: 11/30/18 19:56 Dose:  10 mg


Hydralazine HCl (Apresoline)  50 mg PO BID [facility]


   Last Admin: 11/30/18 17:41 Dose:  50 mg


Losartan Potassium (Cozaar)  100 mg PO DAILY [facility]


   Last Admin: 11/30/18 10:32 Dose:  100 mg


Silver Sulfadiazine (Silvadene 1% 25 Gm)  0 gm TP BID [facility]


   Last Admin: 11/30/18 17:41 Dose:  Not Given


Spironolactone (Aldactone)  25 mg PO BID [facility]


   Last Admin: 11/30/18 17:41 Dose:  25 mg


Verapamil HCl (Calan Sr Tab)  240 mg PO DAILY [facility]


   Last Admin: 11/30/18 10:31 Dose:  240 mg











- Labs


Labs: 


                                        





                                 11/30/18 05:45 





                                 11/30/18 05:45 





                                        











PT  12.7 SECONDS (9.4-12.5)  H  11/27/18  16:00    


 


INR  1.11   11/27/18  16:00    


 


APTT  25.3 Seconds (25.1-36.5)   11/27/18  16:00    














- Constitutional


Appears: Non-toxic, No Acute Distress





- Head Exam


Head Exam: NORMAL INSPECTION, NORMOCEPHALIC





- Eye Exam


Eye Exam: Normal appearance


Pupil Exam: NORMAL ACCOMODATION





- ENT Exam


ENT Exam: Mucous Membranes Moist, Normal Exam





- Respiratory Exam


Respiratory Exam: Clear to Ausculation Bilateral, NORMAL BREATHING PATTERN





- Cardiovascular Exam


Cardiovascular Exam: +S1, +S2





- GI/Abdominal Exam


GI & Abdominal Exam: Soft, Normal Bowel Sounds





- Extremities Exam


Extremities Exam: Full ROM


Additional comments: 





1+edema





- Neurological Exam


Neurological Exam: Alert, Awake, Oriented x3





- Psychiatric Exam


Psychiatric exam: Normal Affect, Normal Mood





- Skin


Skin Exam: Dry, Normal Color, Warm





Assessment and Plan





- Assessment and Plan (Free Text)


Assessment: 








An 85 year old female who came in to the ER due to palpitations. History of 

uncontrolled hypertension,non obstructive coronary artery disease, cardiac cath 

was done in 1988 and 1997, hyperlipidemia, recent renal artery stenosis with 

bilateral  stent placed last 11/14/18. Recent echo 11/10/18 showed  normal LV 

size, normal LV function. Admitted for hypotension and severe 

hypokalemia.Replenished potassium as needed,Adjusted medications for 

antihypertension. Still feeling of palpitation inspite of correction of 

hypokalemia, Placed on holter monitor. 











Plan: 





Denies palpitation


Per patient it seems that Synthroid is causing her palpitations


Synthroid was discontinued 


Blood pressure this morning is 143/66


Holter monitor,will follow up results


Heart rate controlled


Blood pressure controlled today


On Clonidine 0.2 mg daily, ASA 81 mg daily


 Cozaar 100 mg daily, Verapamil 240 mg daily, Hydralazine 10 mg QID PRN


 Aldactone 25 mg BID


Continue current medications


Continue current treatment


Out patient stress test


Chart reviewed


Will follow up





Plan and treatment discussed with Dr. Mcgraw

## 2018-12-01 NOTE — PN
DATE:  12/02/2018



SUBJECTIVE:  The patient is seen lying in bed.  She complains of feeling a

little lightheaded.  She complains of feeling of palpitations.  She also

complains of a headache.  Once again this morning her pressure was 198/82. 

She denies any nausea.  She denies any vomiting.



PHYSICAL EXAMINATION:

GENERAL:  Elderly lady lying in bed.

VITAL SIGNS:  Blood pressure was 198/82, heart rate 93, respiratory rate 18

and temperature 98.

HEENT:  Normocephalic, atraumatic, positive pallor.

NECK:  Supple, no JVD.

LUNGS:  Bilateral equal air entry, bilateral equal expansion.

CARDIAC:  S1 and S2 regular rate and rhythm, no murmur, no rub.

ABDOMEN:  Obese, distended, soft, nontender, bowel sounds present.

EXTREMITIES:  No lower extremity edema.



LABORATORY DATA:  No new labs.



CURRENT MEDICATIONS:  Aldactone 25 b.i.d., _____ 240, losartan 100, Ecotrin

81, clonidine 0.2, Protonix, Silvadene and Xanax.



ASSESSMENT:

1.  Severe uncontrolled hypertension, hydralazine added by Dr. Mcgraw

yesterday evening.

2.  Recent bilateral renal artery stenting.

3.  Hyperlipidemia.

4.  Episodic hypertension?



PLAN

1.  Change clonidine to Catapres patch #1, discontinue p.o. clonidine 0.2.

2.  Continue losartan, verapamil and hydralazine.

3.  Renal artery Dopplers show patent stents.

4.  Check plasma metanephrines

5.  Discussed with the daughter at bedside.







__________________________________________

Katiuska Alfred MD



DD:  12/01/2018 15:19:53

DT:  12/01/2018 15:22:26

Job # [id number]

## 2018-12-01 NOTE — PN
DATE:  12/01/2018



SUBJECTIVE:  The patient is an 85-year-old.  Seen and examined.  Spiked

blood pressure again this morning, had frontal headache and complaining of

having heartburn off and on.



PHYSICAL EXAMINATION:

VITAL SIGNS:  The patient is afebrile, pulse 93, respirations 18, blood

pressure 198/82.

LUNGS:  Bilateral fair air flow. No rhonchi or  crackle.

HEART:  S1 and S2 audible.

ABDOMEN:  Soft, nontender.  No rebound.  No guarding.

NEUROLOGICAL:  The patient is awake and alert, oriented and communicative.



ASSESSMENT:

1.  Fluctuating blood pressure, etiology has been unclear.

2.  History of gastroesophageal reflux disease.



PLAN:

1.  Hydralazine has been added.  Also start the patient on Protonix.

2.  Dr. Cline to consult for peptic ulcer disease evaluation.







__________________________________________

Pamela Ram MD



DD:  12/01/2018 11:15:02

DT:  12/01/2018 12:34:21

Job # [id number]

## 2018-12-01 NOTE — PN
DATE:  12/01/2018



REASON FOR CONSULTATION AND FOLLOWUP:  Cardiac evaluation; admitted with

palpitation, hypokalemia, history of hypertension, history of renal artery

stenosis status post stent.



This note is in addition to dictated by the nurse practitioner, Jennifer Moreno.



Patient had repeat renal artery duplex scan done after having a stent.  The

stent in the renal artery was bilaterally patent with a normal velocity. 

The kidneys are normal _____, so no significant renal artery stenosis or

in-stent restenosis noted.  Pressure is much better controlled in the

morning, 157/71, started also on hydralazine 50 twice a day.  Continue

spironolactone, continue losartan.  We will review the Holter that is done,

so far no evidence of arrhythmia noted.  Sometimes heart rate is down to

47, so we will not add in beta blocker, patient is on verapamil 240.  We

will follow with you.



If remained stable, possible discharge in a day or two.  Hypokalemia

resolved.



Thank you Dr. Ram for providing us the opportunity in taking care of

Lynda Abdul.







__________________________________________

Mary Mcgraw MD





DD:  12/01/2018 10:20:53

DT:  12/01/2018 12:11:40

Job # [id number]

## 2018-12-02 RX ADMIN — VERAPAMIL HYDROCHLORIDE SCH MG: 240 TABLET, FILM COATED, EXTENDED RELEASE ORAL at 09:36

## 2018-12-02 RX ADMIN — SILVER SULFADIAZINE SCH GM: 10 CREAM TOPICAL at 10:39

## 2018-12-02 RX ADMIN — PANTOPRAZOLE SODIUM SCH MG: 40 TABLET, DELAYED RELEASE ORAL at 06:27

## 2018-12-02 RX ADMIN — SILVER SULFADIAZINE SCH GM: 10 CREAM TOPICAL at 17:39

## 2018-12-02 RX ADMIN — POLYETHYLENE GLYCOL 3350 SCH GM: 17 POWDER, FOR SOLUTION ORAL at 11:39

## 2018-12-02 NOTE — CON
DATE:  12/01/2018



This patient was seen and evaluated earlier today.



REASON FOR CONSULT:  Epigastric discomfort and heartburn.



HISTORY OF PRESENT ILLNESS:  This 85-year-old patient was admitted to the

hospital with dizziness.  The patient has bilateral renal artery stenosis,

status post angioplasty done in the past.  The patient is now complaining

of some heartburn and epigastric pain.  The patient had an endoscopy done

in 2016, was found to have gastric ulcers in the body.  The patient had a

nonerosive reflux disease at that time.  The patient subsequently also had

a colonoscopy done and she was found to have an incomplete colonoscopy

because of the extreme tortuosity of the colon.  The patient subsequently

had barium enema done in view of the change of bowel habits.  Barium enema

was negative.  The patient has been complaining of nausea and reflux

symptoms.  No difficulty in swallowing.  There is some mild epigastric

discomfort on and off.



PAST MEDICAL HISTORY:  As above.  She has hypertension and dyslipidemia.



SOCIAL HISTORY:  An ex-smoker.  Socially drinks alcohol.



ALLERGIES:  SHE IS ALLERGIC TO CODEINE AND AVELOX.



REVIEW OF SYSTEMS:  Positive as above.  Other systems reviewed negative.



PHYSICAL EXAMINATION

GENERAL:  The patient is lying on the bed, not in acute distress.

VITAL SIGNS:  Temperature is 98.1, blood pressure 148/61, respirations 19,

O2 saturation 96%.

HEENT:  Atraumatic.  Anicteric.

NECK:  Supple.

HEART:  S1, S2 heard.

LUNGS:  Bilateral air entry present.

ABDOMEN:  Soft.  There is no mass palpable.  No tenderness.

EXTREMITIES:  No cyanosis.  No clubbing.

NEUROLOGIC:  Alert and oriented, moves all extremities.  Interestingly, the

patient does have some retrosternal discomfort and the chest wall is like a

slight pectus excavatum type, but the patient has significant tenderness

reproducible in the chest wall area.



LABORATORY DATA:  Hemoglobin 11.5, hematocrit 36.3, WBC is 9.2, platelets

248.  Chemistry showed a BMP is essentially normal.  LFTs done on

11/29/2018 were essentially normal.



IMPRESSION:  This 85-year-old patient has a past medical history of gastric

ulcers, history of diverticulosis, redundant colon, now has nausea and

epigastric discomfort.  The patient does have reproducible tenderness in

the midsternal and lower sternal area.  Etiology is unclear.  The patient

did have some incomplete colonoscopy in the past, but the barium enema was

negative except for redundant colon.



Other comorbidities include:

1.  Blood pressure is labile.

2.  Renal artery stenosis.



RECOMMENDATIONS:

1.  Advised to continue pantoprazole, she is on 40 mg once daily.

2.  I would recommend CT of the chest and also abdomen and pelvis to

further evaluate with only p.o. contrast.



I did discuss with the patient's daughter at length.  We will continue to

closely follow up her care and suggest further management based on the

clinical course.







__________________________________________

Ellen Cline MD





DD:  12/01/2018 18:51:11

DT:  12/02/2018 1:59:20

Job # [id number]

## 2018-12-02 NOTE — CP.PCM.PN
Subjective





- Date & Time of Evaluation


Date of Evaluation: 12/02/18


Time of Evaluation: 06:35





- Subjective


Subjective: 








No distress, lying in bed, awake, alert, 





Reason for consultation and follow up: Cardiac evaluation of palpitation and 

hypokalemia. History of hypertension, renal artery stenosis





Seen and examined by me and Dr. Mcgraw





Objective





- Vital Signs/Intake and Output


Vital Signs (last 24 hours): 


                                        











Temp Pulse Resp BP Pulse Ox


 


 98.1 F   55 L  19   144/65   96 


 


 12/01/18 16:57  12/02/18 06:00  12/01/18 16:57  12/01/18 22:00  12/01/18 16:57








Intake and Output: 


                                        











 12/01/18 12/02/18





 18:59 06:59


 


Intake Total  600


 


Balance  600














- Medications


Medications: 


                               Current Medications





Acetaminophen (Tylenol 325mg Tab)  650 mg PO Q6H PRN


   PRN Reason: Fever >100.4 F


Alprazolam (Xanax)  0.25 mg PO HS [facility]; Protocol


   Stop: 12/07/18 22:01


   Last Admin: 12/01/18 21:57 Dose:  Not Given


Aspirin (Ecotrin)  81 mg PO DAILY [facility]


   Last Admin: 12/01/18 09:28 Dose:  81 mg


Hydralazine HCl (Apresoline)  10 mg PO QID PRN


   PRN Reason: for sbo>170


   Last Admin: 12/01/18 14:43 Dose:  10 mg


Hydralazine HCl (Apresoline)  50 mg PO BID [facility]


   Last Admin: 12/01/18 17:51 Dose:  50 mg


Losartan Potassium (Cozaar)  100 mg PO DAILY [facility]


   Last Admin: 12/01/18 09:28 Dose:  100 mg


Pantoprazole Sodium (Protonix Ec Tab)  40 mg PO 0630 [facility]


   Last Admin: 12/02/18 06:27 Dose:  40 mg


Silver Sulfadiazine (Silvadene 1% 25 Gm)  0 gm TP BID [facility]


   Last Admin: 12/01/18 17:57 Dose:  25 gm


Spironolactone (Aldactone)  25 mg PO BID [facility]


   Last Admin: 12/01/18 17:51 Dose:  25 mg


Verapamil HCl (Calan Sr Tab)  240 mg PO DAILY [facility]


   Last Admin: 12/01/18 09:27 Dose:  240 mg











- Labs


Labs: 


                                        





                                 11/30/18 05:45 





                                 11/30/18 05:45 





                                        











PT  12.7 SECONDS (9.4-12.5)  H  11/27/18  16:00    


 


INR  1.11   11/27/18  16:00    


 


APTT  25.3 Seconds (25.1-36.5)   11/27/18  16:00    














- Constitutional


Appears: Non-toxic, No Acute Distress





- Head Exam


Head Exam: NORMAL INSPECTION, NORMOCEPHALIC





- Eye Exam


Eye Exam: Normal appearance


Pupil Exam: NORMAL ACCOMODATION





- ENT Exam


ENT Exam: Mucous Membranes Moist, Normal Exam





- Respiratory Exam


Respiratory Exam: Clear to Ausculation Bilateral, NORMAL BREATHING PATTERN





- Cardiovascular Exam


Cardiovascular Exam: +S1, +S2


Additional comments: 





No JVD


Telemetry SB-NSR 50-60's





- GI/Abdominal Exam


GI & Abdominal Exam: Soft, Normal Bowel Sounds





- Extremities Exam


Extremities Exam: Full ROM, Normal Capillary Refill


Additional comments: 





1+ edema on right leg





- Neurological Exam


Neurological Exam: Alert, Awake, Oriented x3





- Psychiatric Exam


Psychiatric exam: Normal Affect, Normal Mood





- Skin


Skin Exam: Dry, Normal Color, Warm





Assessment and Plan





- Assessment and Plan (Free Text)


Assessment: 








An 85 year old female who came in to the ER due to palpitations. History of 

uncontrolled hypertension,non obstructive coronary artery disease, cardiac cath 

was done in 1988 and 1997, hyperlipidemia, recent renal artery stenosis with 

bilateral  stent placed last 11/14/18. Recent echo 11/10/18 showed  normal LV 

size, normal LV function. Admitted for hypotension and severe 

hypokalemia.Replenished potassium as needed,Adjusted medications for 

antihypertension. Still feeling of palpitation inspite of correction of 

hypokalemia, Placed on holter monitor to rule out arrythmias,Renal ultrasound 

done with patent renal stents. 








Plan: 





No distress,feels some palpitations but less


Feels much better after discontinuing Synthroid


Blood pressure labile 


Latest Blood pressure 144/65


Holter monitor,results unremarkable,


 maximum heart rate 111 beats/min with rare APC's/VPC's


Heart rate controlled, SB -NSR


On Clonidine 0.2 mg daily, ASA 81 mg daily


 Cozaar 100 mg daily, Verapamil 240 mg daily, Hydralazine 10 mg QID PRN


 Aldactone 25 mg BID


Seen by GI for epigastric pain


Continue current medications


Continue current treatment


Out patient stress test


Chart reviewed


Will follow up





Plan and treatment discussed with Dr. Mcgraw

## 2018-12-02 NOTE — PN
DATE:  12/02/2018



SUBJECTIVE:  The patient has no complaints of any chest pain or shortness

of breath or headaches or dizziness.



PHYSICAL EXAMINATION:

VITAL SIGNS:  Temperature is 98.2, pulse of 73, blood pressure 187/87 and

respirations 20.

GENERAL:  The patient is lying in bed, flat, comfortable.

HEENT:  No oral lesion.  Anicteric sclerae.  Moist mucosa.

NECK:  No JVD, adenopathy, or thyromegaly.

CARDIOVASCULAR:  S1 and S2, regular.  No murmurs, rubs, or gallops.

LUNGS:  Clear to auscultation bilaterally.  No wheeze, rales, or rhonchi.

ABDOMEN:  Bowel sounds are positive, soft, nontender and nondistended.

EXTREMITIES:  No cyanosis, clubbing or edema.



LABORATORY DATA:  White count of 9.2 and hemoglobin 11.5.  Creatinine is

1.0



CT of the chest, abdomen and pelvis is pending.



ASSESSMENT:

1.  Hypertension.

2.  Gastroesophageal reflux disease.

3.  Bilateral renal artery stenosis with stent.



PLAN:  The patient is currently comfortable.  She had hypokalemia that has

improved.  The patient is receiving Aldactone daily.  She is being followed

by Nephrology.  The patient is on hydralazine.  She is going to continue

with aspirin daily.  She is on Xanax for anxiety.  She has CT that is

pending.







__________________________________________

Dilip Joyce MD





DD:  12/02/2018 10:31:52

DT:  12/02/2018 10:33:02

Job # [id number]

## 2018-12-02 NOTE — CARD
--------------- APPROVED REPORT --------------





Date of service: 12/01/2018



Reason for Test: PALPITATIONS



Hookup date: 2018-11-29

Scan date: 2018-12-01

Recording time: 23 HR 59 MIN



Heart Rate Data

Total Beats: 23581

Min HR: 39 BPM at 4:00AM

Avg HR: 56 BPM

Max HR: 111 BPM at 12:43PM



Ventricular Ectopy

Total VE Beats: 29 (%)

Couplets: 2 Events

Single/Interp PVC: 18/0

Single/Late VE's: 7/0



Supraventricular Ectopy

Total VE Beats: 546 (0.7%)

Atrial Runs: 1

   Beats: 4

   Longest: 4

   Fastest: 125 BPM

Atrial Pairs: 6 Events

Drop/Late: 0/1

Longest R-R: 1.8 sec at 4:00 AM

Single PAC's: 517

Bi/Trigeminy: 0/12 Beats



Conclusion

SINUS RHYTHM / SINUS BRADYCARDIA / SINUS TACHYCARDIA

MINIMUM HR 39 BPM

MAXIMUM  BPM

RARE APC'S, ISOLATED PAIRED,ABERRANT, 1 RUN-4 BEATS, 125 BPM

ISOLATED VPC'S, PAIRED

DIARY ENTRIES:  "PALPITATIONS":  SINUS  BPM, ISOLATED APC'S, 1 

RUN CONSECUTIVE 4 BTS, 125 BPM, ISOLATED VPC

## 2018-12-02 NOTE — PN
DATE:  12/02/2018



REASON FOR CONSULTATION:  Followup cardiac evaluation, palpitation,

hypokalemia, history of hypertension, history of renal artery stenosis,

status post stenting.



SUBJECTIVE:  The patient feels headache.  Blood pressure this morning was

165/70, early dose was given.  The patient complained of headache.  Holter

reviewed.  Preliminarily shows sinus tachycardia, maximum heart rate 111

and lowest is 45.  Telemetry shows lowest heart rate 45.



RECOMMENDATION:  Continue spironolactone b.i.d.  Continue hydralazine 50 mg

b.i.d.  Continue verapamil.  Continue clonidine.  Lowest heart rate is

secondary to verapamil and central effect of the clonidine, but the patient

is intact.  We will follow with you.



Thank you Dr. Ram for providing us the opportunity in taking care of

the patient.  We will repeat the blood workup in the morning.



This note is addition to the note dictated by nurse practitioner.





__________________________________________

Mary Mcgraw MD





DD:  12/02/2018 9:29:13

DT:  12/02/2018 10:38:10

Job # [id number]

## 2018-12-02 NOTE — CT
Date of service: 



12/01/2018



PROCEDURE:  CT Chest, Abdomen and Pelvis. 



HISTORY:

History of PUD, atypical CP



COMPARISON:

Comparison made with CT scan abdomen pelvis 05/28/2016 which also 

imaged both lung bases 



TECHNIQUE:

Contiguous helical/transaxial sections of the chest abdomen pelvis 

performed without oral or intravenous contrast material.  Additional 

2D sagittal and coronal reformats generated. 



Radiation dose:



Total exam DLP = 760.37 mGy-cm.



This CT exam was performed using one or more of the following dose 

reduction techniques: Automated exposure control, adjustment of the 

mA and/or kV according to patient size, and/or use of iterative 

reconstruction technique.



FINDINGS:



CT CHEST WITH CONTRAST:



LUNGS:

Clear. No nodule, mass or consolidation.



MEDIASTINUM:

The heart is enlarged.  No significant pericardial effusion. 



Ascending thoracic aorta measures approximately 3.5 cm and descending 

thoracic aorta measures approximately 2.4 cm.  Calcified 

atherosclerotic plaque seen along the thoracic aorta.  Pulmonary 

trunk measures approximately 3.4 cm. 



Trachea is midline and patent with no large endoluminal lesions. 



Air is seen intermittently throughout the esophagus.  Tiny hiatal 

hernia.  



The right lobe of the thyroid gland is enlarged and heterogeneous 

with at least 1 discrete small nodule in the posterior aspect of the 

right lobe.  Follow-up thyroid ultrasound recommended. 



LYMPH NODES:

There are a few small nonspecific mediastinal lymph nodes.  

Evaluation for hilar adenopathy limited due to the lack of 

circulating intravenous contrast material. 



PLEURA:

No acute consolidation.  However there does appear to be some minor 

scarring changes in the left lung base including the lingular region 

as well as right middle lobe.  



There is a tiny approximately 1.6 mm nodule left lateral upper lobe 

near the pleural surface axial series 4, image number 46.  Another 

tiny approximately found the is while the there was a 2 mm nodule 

seen in the left posterolateral lung base (axial series image number 

74).  Small approximately 1.9 mm nodule left lateral lung base in the 

lateral sulcus axial image number 77 



There is a small 3.5 mm nodule right lower lobe near the pleural 

surface along the posterolateral convexity.. 



The high follow-up CT scan in 6 months could be performed to assess 

stability. 



BONES:

Mild diffuse demineralization.  Minor chronic appearing anterior 

stature loss changes of several mid thoracic segments with increase 

kyphosis.



Multilevel degenerative spondylosis of the lower cervical and 

thoracic spine. 



OTHER FINDINGS:

None.



CT ABDOMEN AND PELVIS:



LIVER:

Unremarkable. No gross lesion or ductal dilatation. 



GALLBLADDER AND BILE DUCTS:

Unremarkable. 



PANCREAS:

Unremarkable. No gross lesion or ductal dilatation.



SPLEEN:

Unremarkable. 



ADRENALS:

Mildly enlarged left adrenal gland. 



KIDNEYS AND URETERS:

Kidneys demonstrate relatively symmetric size.  No evidence of 

nephrolithiasis or hydronephrosis..  Prominent right extrarenal 

pelvis note made of some minimal infiltration changes in the left 

perinephric fat nonspecific. 



VASCULATURE:

Moderate aortic atherosclerotic calcification or mural plaque 

present. Unremarkable. No aortic aneurysm. 



BOWEL:

Evaluation of the bowel is somewhat limited due to incomplete 

opacification.  The stomach is partially distended with contrast 

material admixed with food debris and air.  Visualized loops of small 

bowel exhibit normal contour and caliber.  No evidence of acute 

mechanical small bowel obstruction.  There may be some minimal 

fecalized content however within a few loops of small bowel.  No 

evidence of acute mechanical bowel obstruction with oral contrast 

material extending into the colon to the level of the proximal/mid 

ascending colon.  Large amount of stool is seen throughout the cecum, 

ascending and transverse colon consistent with mild fecal 

retention/constipation.  The remaining descending colon and sigmoid 

relatively collapsed with the exception of some air within the distal 

redundant portion of the sigmoid colon. 



APPENDIX:

Normal appendix. 



PERITONEUM:

Unremarkable. No free fluid. No free air. 



LYMPH NODES:

Unremarkable. No enlarged lymph nodes. 



BLADDER:

Urinary bladder incompletely distended which in part accounts for 

thick-walled appearance.  Correlation with cystitis recommended. 



REPRODUCTIVE:

Hysterectomy. 



BONES:

Multilevel degenerative spondylosis of the lumbar spine. 



OTHER FINDINGS:

None.



IMPRESSION:

There are no acute intrathoracic or intra-abdominal abnormalities.  

Scattered bilateral parenchymal lung nodules for which follow-up CT 

scan of the chest recommended 6 months. 



Findings consistent with constipation. 



See above discussion for additional findings and details.

## 2018-12-03 VITALS — RESPIRATION RATE: 20 BRPM

## 2018-12-03 LAB
BASOPHILS # BLD AUTO: 0.01 K/MM3 (ref 0–2)
BASOPHILS NFR BLD: 0.1 % (ref 0–3)
BUN SERPL-MCNC: 24 MG/DL (ref 7–21)
CALCIUM SERPL-MCNC: 10.4 MG/DL (ref 8.4–10.5)
EOSINOPHIL # BLD: 0.4 10*3/UL (ref 0–0.7)
EOSINOPHIL NFR BLD: 4.1 % (ref 1.5–5)
ERYTHROCYTE [DISTWIDTH] IN BLOOD BY AUTOMATED COUNT: 13.8 % (ref 11.5–14.5)
GFR NON-AFRICAN AMERICAN: 39
GRANULOCYTES # BLD: 6.41 10*3/UL (ref 1.4–6.5)
GRANULOCYTES NFR BLD: 64 % (ref 50–68)
HGB BLD-MCNC: 12.3 G/DL (ref 12–16)
LYMPHOCYTES # BLD: 2.2 10*3/UL (ref 1.2–3.4)
LYMPHOCYTES NFR BLD AUTO: 21.5 % (ref 22–35)
MCH RBC QN AUTO: 29.5 PG (ref 25–35)
MCHC RBC AUTO-ENTMCNC: 31.5 G/DL (ref 31–37)
MCV RBC AUTO: 93.8 FL (ref 80–105)
MONOCYTES # BLD AUTO: 1 10*3/UL (ref 0.1–0.6)
MONOCYTES NFR BLD: 10.3 % (ref 1–6)
PLATELET # BLD: 240 10^3/UL (ref 120–450)
PMV BLD AUTO: 8.8 FL (ref 7–11)
RBC # BLD AUTO: 4.17 10^6/UL (ref 3.5–6.1)
WBC # BLD AUTO: 10 10^3/UL (ref 4.5–11)

## 2018-12-03 RX ADMIN — POLYETHYLENE GLYCOL 3350 SCH GM: 17 POWDER, FOR SOLUTION ORAL at 17:24

## 2018-12-03 RX ADMIN — VERAPAMIL HYDROCHLORIDE SCH MG: 240 TABLET, FILM COATED, EXTENDED RELEASE ORAL at 09:52

## 2018-12-03 RX ADMIN — SILVER SULFADIAZINE SCH GM: 10 CREAM TOPICAL at 09:53

## 2018-12-03 RX ADMIN — SILVER SULFADIAZINE SCH GM: 10 CREAM TOPICAL at 17:29

## 2018-12-03 RX ADMIN — PANTOPRAZOLE SODIUM SCH MG: 40 TABLET, DELAYED RELEASE ORAL at 06:03

## 2018-12-03 RX ADMIN — POLYETHYLENE GLYCOL 3350 SCH GM: 17 POWDER, FOR SOLUTION ORAL at 09:49

## 2018-12-03 NOTE — CP.PCM.PN
<Yasmany Olivier - Last Filed: 12/03/18 18:59>





Subjective





- Date & Time of Evaluation


Date of Evaluation: 12/03/18


Time of Evaluation: 08:25





- Subjective


Subjective: 





PGY6 GI Fellow Progress Note


Patient seen and examined bedside this morning. Patient's daughter at bedside 

this morning. The patient admits to ongoing constipation, though she was able to

pass small amount of stool yesterday. States abdominal/chest discomfort has r

esolved at this juncture. Patient's daughter concerned about chronic use of 

laxative therapy and requesting more natural interventions. No events overnight.





12 system ROS performed and negative except where stated





Objective





- Vital Signs/Intake and Output


Vital Signs (last 24 hours): 


                                        











Temp Pulse Resp BP Pulse Ox


 


 98.1 F   81   20   174/84 H  95 


 


 12/03/18 08:26  12/03/18 09:52  12/03/18 08:26  12/03/18 09:52  12/03/18 08:26








Intake and Output: 


                                        











 12/03/18 12/03/18





 06:59 18:59


 


Intake Total 120 


 


Balance 120 














- Medications


Medications: 


                               Current Medications





Acetaminophen (Tylenol 325mg Tab)  650 mg PO Q6H PRN


   PRN Reason: Fever >100.4 F


Alprazolam (Xanax)  0.25 mg PO HS [facility]; Protocol


   Stop: 12/07/18 22:01


   Last Admin: 12/02/18 21:32 Dose:  Not Given


Aspirin (Ecotrin)  81 mg PO DAILY [facility]


   Last Admin: 12/03/18 09:49 Dose:  81 mg


Famotidine (Pepcid)  40 mg PO HS [facility]


   Last Admin: 12/02/18 21:31 Dose:  40 mg


Hydralazine HCl (Apresoline)  10 mg PO QID PRN


   PRN Reason: for sbo>170


   Last Admin: 12/01/18 14:43 Dose:  10 mg


Hydralazine HCl (Apresoline)  50 mg PO BID [facility]


   Last Admin: 12/03/18 09:52 Dose:  50 mg


Losartan Potassium (Cozaar)  100 mg PO DAILY [facility]


   Last Admin: 12/03/18 09:52 Dose:  100 mg


Pantoprazole Sodium (Protonix Ec Tab)  40 mg PO 0630 [facility]


   Last Admin: 12/03/18 06:03 Dose:  40 mg


Polyethylene Glycol (Miralax)  17 gm PO DAILY [facility]


   Last Admin: 12/03/18 09:49 Dose:  17 gm


Silver Sulfadiazine (Silvadene 1% 25 Gm)  0 gm TP BID [facility]


   Last Admin: 12/03/18 09:53 Dose:  25 gm


Spironolactone (Aldactone)  25 mg PO BID [facility]


   Last Admin: 12/03/18 09:49 Dose:  25 mg


Verapamil HCl (Calan Sr Tab)  240 mg PO DAILY [facility]


   Last Admin: 12/03/18 09:52 Dose:  240 mg











- Labs


Labs: 


                                        





                                 12/03/18 05:35 





                                 12/03/18 05:35 





                                        











PT  12.7 SECONDS (9.4-12.5)  H  11/27/18  16:00    


 


INR  1.11   11/27/18  16:00    


 


APTT  25.3 Seconds (25.1-36.5)   11/27/18  16:00    














- Constitutional


Appears: Non-toxic, No Acute Distress





- Eye Exam


Eye Exam: EOMI, PERRL





- ENT Exam


ENT Exam: Mucous Membranes Moist





- Respiratory Exam


Respiratory Exam: Rales (L>R).  absent: Clear to Ausculation Bilateral, Rhonchi,

Wheezes





- Cardiovascular Exam


Cardiovascular Exam: RRR, +S1, +S2





- GI/Abdominal Exam


GI & Abdominal Exam: Soft, Normal Bowel Sounds.  absent: Distended, Firm, 

Guarding, Rigid, Tenderness, Organomegaly





- Extremities Exam


Extremities Exam: Normal Inspection.  absent: Pedal Edema





- Neurological Exam


Neurological Exam: Alert, Awake, Oriented x3





- Psychiatric Exam


Psychiatric exam: Normal Affect, Normal Mood





- Skin


Skin Exam: Dry, Warm





Assessment and Plan





- Assessment and Plan (Free Text)


Assessment: 


Patient is an 86yo female with PMHx significant for B/L renal artery stenosis 

s/p stenting, HTN and dyslipidemia who presented with labile blood pressure, 

palpitations and chest pain.


-Uncontrolled hypertension - likely a result of known B/L renal artery stenosis


-Chest pain - R/O GERD vs MSK vs cardiac etiologies - resolved at this time


-Chronic constipation


Plan: 





-Encouraged patient to maintain a high fiber diet with adequate hydration, on 

diuretic therapy at home


-Cardiac work up ongoing


-Increase Miralax to 17g PO BID with Dulcolax PRN for ongoing constipation


-Continue acid suppressive therapy with PPI/H2RA as ordered


-Symptoms have resolved at this time - recommend outpatient follow up





<Ellen Cline - Last Filed: 12/03/18 22:34>





Objective





- Vital Signs/Intake and Output


Vital Signs (last 24 hours): 


                                        











Temp Pulse Resp BP Pulse Ox


 


 97.8 F   85   20   109/69   98 


 


 12/03/18 18:00  12/03/18 18:00  12/03/18 18:00  12/03/18 18:00  12/03/18 18:00








Intake and Output: 


                                        











 12/03/18 12/04/18





 18:59 06:59


 


Intake Total 600 


 


Balance 600 














- Medications


Medications: 


                               Current Medications





Acetaminophen (Tylenol 325mg Tab)  650 mg PO Q6H PRN


   PRN Reason: Fever >100.4 F


Alprazolam (Xanax)  0.25 mg PO HS [facility]; Protocol


   Stop: 12/07/18 22:01


   Last Admin: 12/03/18 22:21 Dose:  Not Given


Aspirin (Ecotrin)  81 mg PO DAILY [facility]


   Last Admin: 12/03/18 09:49 Dose:  81 mg


Famotidine (Pepcid)  20 mg PO HS [facility]


   Last Admin: 12/03/18 22:19 Dose:  20 mg


Hydralazine HCl (Apresoline)  10 mg PO QID PRN


   PRN Reason: for sbo>170


   Last Admin: 12/01/18 14:43 Dose:  10 mg


Hydralazine HCl (Apresoline)  50 mg PO BID [facility]


   Last Admin: 12/03/18 17:25 Dose:  50 mg


Losartan Potassium (Cozaar)  100 mg PO DAILY [facility]


   Last Admin: 12/03/18 09:52 Dose:  100 mg


Metoprolol Tartrate (Lopressor)  12.5 mg PO BID [facility]


   Last Admin: 12/03/18 17:24 Dose:  12.5 mg


Pantoprazole Sodium (Protonix Ec Tab)  40 mg PO 0630 [facility]


   Last Admin: 12/03/18 06:03 Dose:  40 mg


Polyethylene Glycol (Miralax)  17 gm PO BID [facility]


   Last Admin: 12/03/18 17:24 Dose:  17 gm


Silver Sulfadiazine (Silvadene 1% 25 Gm)  0 gm TP BID [facility]


   Last Admin: 12/03/18 17:29 Dose:  25 gm


Spironolactone (Aldactone)  25 mg PO BID [facility]


   Last Admin: 12/03/18 17:24 Dose:  25 mg


Verapamil HCl (Calan Sr Tab)  240 mg PO DAILY [facility]


   Last Admin: 12/03/18 09:52 Dose:  240 mg











- Labs


Labs: 


                                        





                                 12/03/18 05:35 





                                 12/03/18 05:35 





                                        











PT  12.7 SECONDS (9.4-12.5)  H  11/27/18  16:00    


 


INR  1.11   11/27/18  16:00    


 


APTT  25.3 Seconds (25.1-36.5)   11/27/18  16:00    














Attending/Attestation





- Attestation


I have personally seen and examined this patient.: Yes


I have fully participated in the care of the patient.: Yes


I have reviewed all pertinent clinical information, including history, physical 

exam and plan: Yes


Notes (Text): 


This is an addendum to GI progress report dictated by the GI Fellow. The patient

was seen and examined earlier.  Medical records, lab studies, imagings were 

reviewed.  Last 24 hours events reviewed.  Agreed with the above treatment plan 

as outlined in GI Fellow 's notes with the addition of the following


Continue AM PPI and PM dose of H2 blockers 


Would consider repeat EGD in few months time


Elective EGD 


Patient had a stress testtoday


Continue Cardiology Followup


12/03/18 22:31

## 2018-12-03 NOTE — PN
DATE:  12/03/2018



SUBJECTIVE:  The patient is an 85-year-old, seen and examined, laying in

bed, and seems to be comfortable.  She is in Radiology Unit to have stress

test done.  No dizziness and no headache.



PHYSICAL EXAMINATION:

VITAL SIGNS:  She is afebrile, pulse 71, respirations 20, blood pressure

174/84.

LUNGS:  Bilateral fair air flow.  No rhonchi or crackle.

HEART:  S1 and S2 audible.

ABDOMEN:  Soft and nontender.  No rebound.  No guarding.

NEUROLOGICAL:  She is awake and alert, oriented and communicative.



LABORATORY DATA:  WBC 10, hemoglobin is 12, hematocrit 39, and platelets

240.  Chemistry; sodium 140, potassium 4.0, chloride of 105, CO2 of 28, BUN

24, creatinine 1.3, blood sugar 107.



ASSESSMENT:

1.  Uncontrolled hypertension.

2.  Status post bilateral renal artery stenosis, status post angioplasty,

and stent placement.

3.  Constipation.

4.  Intermittent palpitation.



PLAN:  Patient is getting stress test today.  Metanephrine has been

ordered.  Currently, she is on losartan, verapamil, Hydralazine, and we

will give her a dose of Dulcolax suppository after she finish her _____. 

We will followup her stress test.  Continue current regimen and if patient

remains stable, we _____.







__________________________________________

Pamela Ram MD



DD:  12/03/2018 12:37:36

DT:  12/03/2018 12:55:50

Job # [id number]

## 2018-12-03 NOTE — PN
DATE:  11/30/2018



REASON FOR CONSULTATION:  Cardiac evaluation, palpitation, hypokalemia,

history of hypertension, and renal artery stenosis, status post stent.



SUBJECTIVE:  The patient feels palpitation, nothing documented or recorded

in telemetry, _____ Holter completed this afternoon.  Denies any chest

pain.



This note is in addition to dictated by our nurse practitioner.



This morning patient is little bit better than yesterday 166/72 at 7 a.m.



RECOMMENDATIONS:  Continue aggressive control of high blood pressure,

continue spironolactone was added twice a day.  Hydralazine 10 mg p.o.

q.i.d. p.r.n., verapamil 240, clonidine 0.2 at bedtime, losartan, and

acetaminophen.  We will start hydralazine 50 b.i.d. because the patient is

running still blood pressure at 185/70.  We will follow with you.  We will

start hydralazine low dose 50 b.i.d. and monitor the blood pressure.  We

will put holding parameters hold for systolic blood pressure less than or

equal to 160.



Thank you  _____ for providing us the opportunity in taking care of the

patient, Lynda Abdul.  I will follow with Holter when it is completed.



This note is in addition to dictated by our nurse practitioner, Jennifer Moreno.







__________________________________________

Mary Mcgraw MD





DD:  11/30/2018 16:55:34

DT:  11/30/2018 19:36:27

Job # [id number]

## 2018-12-03 NOTE — PN
DATE:  12/03/2018



REASON FOR CONSULTATION:  Follow up cardiac evaluation, palpitation,

hypokalemia, history of hypertension, history of renal artery stenosis,

status post renal artery stenting.



SUBJECTIVE:  The patient denies any chest pain, shortness of breath, or

palpitation.  Holter reviewed.  Normal sinus.  No arrhythmia.  Significant

arrhythmia noted.  Lowest heart rate 45, maximum heart rate is 111.  Blood

pressure today is 146/80.  The patient is scheduled for a stress today. 

During the stress test, the patient was also complaining of palpitations,

but telemetry monitoring during the test did not show any arrhythmia except

occasional APC, but the patient is complaining of palpitations, heart rate

remained 90 to 100.



RECOMMENDATIONS:  Follow up with stress test.  We will start low dose of

Lopressor 12.5 mg p.o. b.i.d.  The patient had echocardiography on

11/10/2018 that shows normal LV size and normal LV function.  Aortic valve,

mildly calcified aortic stenosis, mild aortic regurgitation, mild tricuspid

regurgitation, mitral regurgitation.  Noted calculated ejection fraction

65%, RVSP 42.  Follow up stress test as mentioned.  We will start low dose

of metoprolol and monitor the heart rate, at 12.5 mg p.o. b.i.d.



Thank you Dr. Ram for providing us the opportunity in taking care of

the patient, Franko Howell.  Yesterday, discussed with daughter Teresa. 

Potassium today is 4.5 and creatinine 1.3.



This note is in addition to the dictation by the nurse practitioner, Jennifer Moreno.





__________________________________________

Mary Mcgraw MD





DD:  12/03/2018 13:34:12

DT:  12/03/2018 14:08:24

Job # [id number]

## 2018-12-03 NOTE — CP.PCM.PN
Subjective





- Date & Time of Evaluation


Date of Evaluation: 12/03/18


Time of Evaluation: 06:30





- Subjective


Subjective: 








Alert, No distress, lying in bed, awake





Reason for consultation and follow up: Cardiac evaluation of palpitation and 

hypokalemia. History of hypertension, renal artery stenosis





Seen and examined by me and Dr. Mcgraw





Objective





- Vital Signs/Intake and Output


Vital Signs (last 24 hours): 


                                        











Temp Pulse Resp BP Pulse Ox


 


 98.4 F   66   19   148/65   96 


 


 12/02/18 20:55  12/03/18 02:00  12/02/18 20:55  12/02/18 23:15  12/02/18 20:55








Intake and Output: 


                                        











 12/02/18 12/03/18





 18:59 06:59


 


Intake Total 1860 


 


Balance 1860 














- Medications


Medications: 


                               Current Medications





Acetaminophen (Tylenol 325mg Tab)  650 mg PO Q6H PRN


   PRN Reason: Fever >100.4 F


Alprazolam (Xanax)  0.25 mg PO HS [facility]; Protocol


   Stop: 12/07/18 22:01


   Last Admin: 12/02/18 21:32 Dose:  Not Given


Aspirin (Ecotrin)  81 mg PO DAILY [facility]


   Last Admin: 12/02/18 09:36 Dose:  81 mg


Famotidine (Pepcid)  40 mg PO HS [facility]


   Last Admin: 12/02/18 21:31 Dose:  40 mg


Hydralazine HCl (Apresoline)  10 mg PO QID PRN


   PRN Reason: for sbo>170


   Last Admin: 12/01/18 14:43 Dose:  10 mg


Hydralazine HCl (Apresoline)  50 mg PO BID [facility]


   Last Admin: 12/02/18 17:33 Dose:  50 mg


Losartan Potassium (Cozaar)  100 mg PO DAILY [facility]


   Last Admin: 12/02/18 09:36 Dose:  100 mg


Pantoprazole Sodium (Protonix Ec Tab)  40 mg PO 0630 [facility]


   Last Admin: 12/03/18 06:03 Dose:  40 mg


Polyethylene Glycol (Miralax)  17 gm PO DAILY [facility]


   Last Admin: 12/02/18 11:39 Dose:  17 gm


Silver Sulfadiazine (Silvadene 1% 25 Gm)  0 gm TP BID [facility]


   Last Admin: 12/02/18 17:39 Dose:  25 gm


Spironolactone (Aldactone)  25 mg PO BID [facility]


   Last Admin: 12/02/18 17:33 Dose:  25 mg


Verapamil HCl (Calan Sr Tab)  240 mg PO DAILY [facility]


   Last Admin: 12/02/18 09:36 Dose:  240 mg











- Labs


Labs: 


                                        





                                 12/03/18 05:35 





                                 12/03/18 05:35 





                                        











PT  12.7 SECONDS (9.4-12.5)  H  11/27/18  16:00    


 


INR  1.11   11/27/18  16:00    


 


APTT  25.3 Seconds (25.1-36.5)   11/27/18  16:00    














- Constitutional


Appears: Non-toxic, No Acute Distress





- Eye Exam


Eye Exam: Normal appearance


Pupil Exam: NORMAL ACCOMODATION





- ENT Exam


ENT Exam: Mucous Membranes Moist, Normal Exam





- Respiratory Exam


Respiratory Exam: Clear to Ausculation Bilateral, NORMAL BREATHING PATTERN





- Cardiovascular Exam


Cardiovascular Exam: +S1, +S2





- GI/Abdominal Exam


GI & Abdominal Exam: Soft, Normal Bowel Sounds





- Extremities Exam


Extremities Exam: Full ROM, Normal Capillary Refill


Additional comments: 





1+ edema right leg





- Neurological Exam


Neurological Exam: Alert, Awake, Oriented x3





- Psychiatric Exam


Psychiatric exam: Normal Affect, Normal Mood





- Skin


Skin Exam: Dry, Normal Color, Warm





Assessment and Plan





- Assessment and Plan (Free Text)


Assessment: 








An 85 year old female who came in to the ER due to palpitations. History of 

uncontrolled hypertension,non obstructive coronary artery disease, cardiac cath 

was done in 1988 and 1997, hyperlipidemia, recent renal artery stenosis with 

bilateral  stent placed last 11/14/18. Recent echo 11/10/18 showed  normal LV 

size, normal LV function. Admitted for hypotension and severe 

hypokalemia.Replenished potassium as needed,Adjusted medications for antihypert

ension. Still feeling of palpitation inspite of correction of hypokalemia, 

Placed on holter monitor to rule out arrythmias,Renal ultrasound done with 

patent renal stents. Synthroid discontinued as per patient request claiming it 

seems that is causing palpitation. Holter monitor,results unremarkable, maximum 

heart rate 111 beats/min,minimum 47/min. with rare APC's/VPC's. 








Plan: 





For Stress test today


Complaining of chest discomfort yesterday


No distress,Denies chest pain or shortness of breath


Blood pressure controlled


Heart rate  NSR 70-80's


On Clonidine 0.2 mg daily, ASA 81 mg daily


 Cozaar 100 mg daily, Verapamil 240 mg daily, Hydralazine 10 mg QID PRN


 Aldactone 25 mg BID


Seen by GI for epigastric pain


Out patient EGD per GI


Continue current medications


Continue current treatment


Chart reviewed


Will follow up





Plan and treatment discussed with Dr. Mcgraw

## 2018-12-03 NOTE — CARD
--------------- APPROVED REPORT --------------





Date of service: 12/03/2018



Protocol: LEXISCAN

Test Type: Lexiscan Sestamibi Stress Test

Attending Physician: Dr. Mary Mcgraw

Referring Physician: Dr. Ram  

Test Indications: Chest Pain

Height:4 ft  2  in

Weight:146lbs 

Medications: Tylenol,Xanax,Aspirin, Pepcid, Hydralazine,Cozaar, 

Protonix,Miralax, Aldactone, Verapamil,Dulcolax, Catapres

Medical History: 85 year old female with history of HTN, Renal 

artery stenosis

Target HR: 135 bpm

Resting ECG: NSR low voltage

Resting Heart Rate: 71 bpm

Resting Blood Pressure: 146/80mmHg

Submaximum (85%): 115 bpm



PROCEDURE

Pharmacologic stress testing was performed using 0.4mg per 5ml of 

regadenoson given intravenously over 7-10 seconds.

  Reversal agent aminophyline 100 mg, given intravenously for Other.



POST EXERCISE

Reason for Termination: Protocol completed

Target HR: No

Max HR: 107 bpm

79% of Maximum Predicted HR: 135 bpm

Exercise duration: 01:44 min:sec, 0 Stage

Exercise capacity: 1.0METs

Max Blood Pressure: 152/68mmHg

Blood Pressure response to exercise: normal resting BP - appropriate 

response

Heart Rate response to exercise: appropriate

Chest Pain: No, none

Angina index: 0

Arrhythmia: Yes, Occ APCs

ST Change: No, none

Deviation: 0 mm



INTERPRETATION

Stress EKG Conclusion: Negative IV Lexiscan for ischemia and for 

chest pain, Nuclear scan to follow.



Signed by  Mary Mcgraw

Electronically Approved: 12/03/2018 14:27:59



EXAM: Myocardial Perfusion REST/STRESS

Stress Test Type: Pharmacologic



Imaging Protocol

Rest Spect myocardial perfusion imaging was performed in supine 

position 55 minutes following the injection of 10.3 mCi of Tc-99 

Myoview.

At peak stress, the patient was injected intravenously with 30.7mCi 

of Tc-99 tetrofosmin after an infusion time of 0 minutes and 10 

seconds.

Gated Stress Spect was performed 70 minutes after intravenous Tc-99 

Myoview injection.

The images were gated to evaluate regional wall motion and calculate 

ventricular ejection fraction.Images were reconstructed using 

backfilter projection method in short horizontal and verticle long 

axis. Spect slices were generated.



LV Perfusion

The quality of the study is good. The left ventricle is normal in 

size. The right ventricle is unremarkable. The lung uptake is normal. 

The distribution of tracer reveals an area of moderately to severely  

decreased perfusion  mid to basal inferolateral walls  on the stress 

study. The remainder of the LV myocardium is unremarkable.  The rest 

myocardial perfusion study shows no significant  improvement of  

defect.



Wall Motion

Wall motion study shows inferlateral hypokinesis of the left 

ventricle.  LVEF = 75%.



Conclusion

1. Abnormal SPECT myocardial perfusion study.

2. Fixed,  inferolateral defect is suggesitve of  myocardial 

injury/infarct.

3. Normal overall LV functioin despite inferolateral hypokinesis.

## 2018-12-03 NOTE — PN
DATE:  12/03/2018



SUBJECTIVE:  The patient is seen in the cardiac cath area.  The patient

underwent stress test.  She feels okay.  She complains of some

palpitations.  She denies any chest pain.  She complains of some dizziness.



PHYSICAL EXAMINATION:

GENERAL:  Obese elderly lady lying in bed.

VITAL SIGNS:  Blood pressure 146/80, heart rate 80, respiratory rate 20,

temperature 97.8.

HEENT:  Normocephalic, atraumatic, positive pallor.

NECK:  Supple, no JVD.

LUNGS:  Bilateral equal air entry, bilateral equal expansion, no rales.

CARDIAC:  S1 and S2, regular rate and rhythm.  No murmur, no rub.

ABDOMEN:  Obese, distended, soft, nontender, bowel sounds present.

EXTREMITIES:  No lower extremity edema.

INTAKE AND OUTPUT:  1980/not charted.



LABORATORY DATA:  WBC 10, hemoglobin 12.3, hematocrit 39, platelets 240.



Sodium 140, potassium 4.5, chloride 105, CO2 of 28, BUN 24, creatinine 1.3,

glucose 107, calcium 10.4, phosphorus 3.2, magnesium 1.9.



CURRENT MEDICATIONS  Aldactone 25 b.i.d., Apresoline 50 b.i.d., Calan 240,

losartan 100, Ecotrin, Catapres patch #1, MiraLax, Pepcid, Protonix, and

Xanax.



ASSESSMENT:

1.  Severe uncontrolled hypertension.

2.  Recent bilateral renal artery stenting.

3.  Hyperlipidemia.

4.  Palpitations.

5.  Abdominal pain.



PLAN:

1.  Follow up stress test.

2.  Continue current antihypertensives.

3.  Continue Aldactone 25 b.i.d.

4.  Blood pressure seems to be much better controlled.







__________________________________________

Katiuska Alfred MD





DD:  12/03/2018 22:28:48

DT:  12/03/2018 22:31:33

Job # [id number]

## 2018-12-04 RX ADMIN — POLYETHYLENE GLYCOL 3350 SCH: 17 POWDER, FOR SOLUTION ORAL at 11:34

## 2018-12-04 RX ADMIN — SILVER SULFADIAZINE SCH GM: 10 CREAM TOPICAL at 13:49

## 2018-12-04 RX ADMIN — SILVER SULFADIAZINE SCH: 10 CREAM TOPICAL at 17:45

## 2018-12-04 RX ADMIN — PANTOPRAZOLE SODIUM SCH MG: 40 TABLET, DELAYED RELEASE ORAL at 05:38

## 2018-12-04 RX ADMIN — VERAPAMIL HYDROCHLORIDE SCH MG: 240 TABLET, FILM COATED, EXTENDED RELEASE ORAL at 09:06

## 2018-12-04 RX ADMIN — POLYETHYLENE GLYCOL 3350 SCH GM: 17 POWDER, FOR SOLUTION ORAL at 17:40

## 2018-12-04 NOTE — CP.PCM.PN
<Yasmany Olivier - Last Filed: 12/04/18 10:57>





Subjective





- Date & Time of Evaluation


Date of Evaluation: 12/04/18


Time of Evaluation: 07:50





- Subjective


Subjective: 





PGY6 GI Fellow Progress Note


Patient seen and examined bedside this morning. The patient states that she is 

feeling well today and has no complaints. Admits to passing small amount of 

stool yesterday.





12 system ROS performed and negative except where stated





Objective





- Vital Signs/Intake and Output


Vital Signs (last 24 hours): 


                                        











Temp Pulse Resp BP Pulse Ox


 


 97.7 F   104 H  20   174/81 H  95 


 


 12/04/18 09:10  12/04/18 09:57  12/04/18 09:10  12/04/18 09:57  12/04/18 09:10








Intake and Output: 


                                        











 12/04/18 12/04/18





 06:59 18:59


 


Intake Total 120 


 


Balance 120 














- Medications


Medications: 


                               Current Medications





Acetaminophen (Tylenol 325mg Tab)  650 mg PO Q6H PRN


   PRN Reason: Fever >100.4 F


Alprazolam (Xanax)  0.25 mg PO HS On license of [facility]; Protocol


   Stop: 12/07/18 22:01


   Last Admin: 12/03/18 22:21 Dose:  Not Given


Aspirin (Ecotrin)  81 mg PO DAILY [facility] of [facility]


   Last Admin: 12/04/18 09:09 Dose:  81 mg


Famotidine (Pepcid)  20 mg PO HS [facility] of [facility]


   Last Admin: 12/03/18 22:19 Dose:  20 mg


Hydralazine HCl (Apresoline)  10 mg PO QID PRN


   PRN Reason: for sbo>170


   Last Admin: 12/04/18 05:59 Dose:  10 mg


Hydralazine HCl (Apresoline)  50 mg PO BID [facility] of [facility]


   Last Admin: 12/04/18 09:07 Dose:  50 mg


Losartan Potassium (Cozaar)  100 mg PO DAILY [facility] of [facility]


   Last Admin: 12/04/18 09:08 Dose:  100 mg


Metoprolol Tartrate (Lopressor)  25 mg PO BID [facility] of [facility]


Pantoprazole Sodium (Protonix Ec Tab)  40 mg PO 0630 [facility]


   Last Admin: 12/04/18 05:38 Dose:  40 mg


Polyethylene Glycol (Miralax)  17 gm PO BID [facility] of [facility]


   Last Admin: 12/03/18 17:24 Dose:  17 gm


Silver Sulfadiazine (Silvadene 1% 25 Gm)  0 gm TP BID [facility] of [facility]


   Last Admin: 12/03/18 17:29 Dose:  25 gm


Spironolactone (Aldactone)  25 mg PO BID [facility] of [facility]


   Last Admin: 12/04/18 09:07 Dose:  25 mg


Verapamil HCl (Calan Sr Tab)  240 mg PO DAILY [facility]


   Last Admin: 12/04/18 09:06 Dose:  240 mg











- Labs


Labs: 


                                        





                                 12/03/18 05:35 





                                 12/03/18 05:35 





                                        











PT  12.7 SECONDS (9.4-12.5)  H  11/27/18  16:00    


 


INR  1.11   11/27/18  16:00    


 


APTT  25.3 Seconds (25.1-36.5)   11/27/18  16:00    














- Constitutional


Appears: Non-toxic, No Acute Distress





- Eye Exam


Eye Exam: EOMI, PERRL





- ENT Exam


ENT Exam: Mucous Membranes Moist





- Respiratory Exam


Respiratory Exam: Clear to Ausculation Bilateral.  absent: Rales, Rhonchi, 

Wheezes





- Cardiovascular Exam


Cardiovascular Exam: RRR, +S1, +S2





- GI/Abdominal Exam


GI & Abdominal Exam: Soft, Normal Bowel Sounds.  absent: Distended, Firm, 

Guarding, Rigid, Tenderness, Organomegaly





- Extremities Exam


Extremities Exam: Normal Inspection.  absent: Pedal Edema





- Neurological Exam


Neurological Exam: Alert, Awake, Oriented x3





- Psychiatric Exam


Psychiatric exam: Normal Affect, Normal Mood





- Skin


Skin Exam: Dry, Warm





Assessment and Plan





- Assessment and Plan (Free Text)


Assessment: 





Patient is an 86yo female with PMHx significant for B/L renal artery stenosis 

s/p stenting, HTN and dyslipidemia who presented with labile blood pressure, 

palpitations and chest pain.


-Uncontrolled hypertension - likely a result of known B/L renal artery stenosis


-Chest pain - R/O GERD vs MSK vs cardiac etiologies - resolved at this time


-Chronic constipation


Plan: 


-Encouraged patient to maintain a high fiber diet with adequate hydration, on 

diuretic therapy at home


-Stress test negative


-Miralax to 17g PO BID, titrate to 1BM/day


-Will not add Dulcolax PRN at this time per patient preference


-Continue acid suppressive therapy with PPI/H2RA as ordered


-Symptoms have resolved at this time - recommend outpatient follow up





<Ellen Cline - Last Filed: 12/04/18 22:21>





Objective





- Vital Signs/Intake and Output


Vital Signs (last 24 hours): 


                                        











Temp Pulse Resp BP Pulse Ox


 


 98.4 F   61   20   125/65   96 


 


 12/04/18 18:00  12/04/18 18:00  12/04/18 18:00  12/04/18 18:00  12/04/18 18:00








Intake and Output: 


                                        











 12/04/18 12/05/18





 18:59 06:59


 


Intake Total 720 


 


Balance 720 














- Medications


Medications: 


                               Current Medications





Acetaminophen (Tylenol 325mg Tab)  650 mg PO Q6H PRN


   PRN Reason: Fever >100.4 F


Alprazolam (Xanax)  0.25 mg PO HS On license of [facility]; Protocol


   Stop: 12/07/18 22:01


   Last Admin: 12/04/18 22:08 Dose:  Not Given


Aspirin (Ecotrin)  81 mg PO DAILY [facility] of [facility]


   Last Admin: 12/04/18 09:09 Dose:  81 mg


Famotidine (Pepcid)  20 mg PO HS [facility] of [facility]


   Last Admin: 12/04/18 21:34 Dose:  20 mg


Hydralazine HCl (Apresoline)  10 mg PO QID PRN


   PRN Reason: for sbo>170


   Last Admin: 12/04/18 05:59 Dose:  10 mg


Hydralazine HCl (Apresoline)  50 mg PO BID On [facility] of [facility]


   Last Admin: 12/04/18 17:40 Dose:  Not Given


Losartan Potassium (Cozaar)  100 mg PO DAILY [facility] of [facility]


   Last Admin: 12/04/18 09:08 Dose:  100 mg


Metoprolol Tartrate (Lopressor)  25 mg PO BID [facility] of [facility]


   Last Admin: 12/04/18 17:41 Dose:  25 mg


Pantoprazole Sodium (Protonix Ec Tab)  40 mg PO 0630 [facility] of [facility]


   Last Admin: 12/04/18 05:38 Dose:  40 mg


Polyethylene Glycol (Miralax)  17 gm PO BID On [facility] of [facility]


   Last Admin: 12/04/18 17:40 Dose:  17 gm


Silver Sulfadiazine (Silvadene 1% 25 Gm)  0 gm TP BID [facility] of [facility]


   Last Admin: 12/04/18 17:45 Dose:  Not Given


Spironolactone (Aldactone)  25 mg PO BID On license of [facility]


   Last Admin: 12/04/18 17:41 Dose:  25 mg


Verapamil HCl (Calan Sr Tab)  240 mg PO DAILY On [facility] of [facility]


   Last Admin: 12/04/18 09:06 Dose:  240 mg











- Labs


Labs: 


                                        





                                 12/03/18 05:35 





                                 12/03/18 05:35 





                                        











PT  12.7 SECONDS (9.4-12.5)  H  11/27/18  16:00    


 


INR  1.11   11/27/18  16:00    


 


APTT  25.3 Seconds (25.1-36.5)   11/27/18  16:00    














Attending/Attestation





- Attestation


I have personally seen and examined this patient.: Yes


I have fully participated in the care of the patient.: Yes


I have reviewed all pertinent clinical information, including history, physical 

exam and plan: Yes


Notes (Text): 


This is an addendum to GI progress report dictated by the GI Fellow. The patient

was seen and examined earlier.  Medical records, lab studies, imagings were 

reviewed.  Last 24 hours events reviewed.  Agreed with the above treatment plan 

as outlined in GI Fellow 's notes with the addition of the following


Feeling slightly better 


On Miralax for constipation


Continue PPI


Elective EGD evaluation


12/04/18 22:21

## 2018-12-04 NOTE — PN
DATE:  12/04/2018



SUBJECTIVE:  The patient is currently seen sitting up in bed on 3R.  She

appears to be comfortable.  She had a negative stress test yesterday.  She

did apparently have palpitations in the morning and was evaluated by her

cardiologist.  She remains on similar blood pressure medication, apparently

a.m. blood pressure readings are elevated, but noontime and evening blood

pressure readings are improved.



OBJECTIVE:

INTAKE/OUTPUT:  Intake is 1980, output not charted.

VITAL SIGNS:  Blood pressure presently is 115/66 at 11:00 a.m., this

morning it was 124/57, but at 9 o'clock this morning it was 181/82.  Heart

rate presently 66.  In the morning, her heart rate was 104.  Respiratory

rate is 20.  Temperature 97.7.

HEENT:  Normocephalic, atraumatic.  Conjunctivae are pink.  Sclerae are

nonicteric.

NECK:  Supple.  No neck vein distention.

CARDIOPULMONARY:  Shows a regular rate and rhythm with MR/AI/TR.  No S3, no

S4.  No rub.

LUNGS:  Clear to auscultation and percussion.  No rales, rhonchi or

wheezing.

ABDOMEN:  Soft.  Bowel sounds normal.  No rebound, guarding or masses.

EXTREMITIES:  Show no lower extremity cyanosis, clubbing or edema.  Distal

lower extremity pulses are 1 to 2+ bilaterally.



LABORATORY DATA AND IMAGING:  Last CBC, white blood cell count from

12/03/2018 10.0 with hemoglobin of 12.3, platelet count is 240,000. 

Chemistries from yesterday on 12/03/2018, normal electrolytes.  BUN 24 with

a creatinine of 1.3 at the upper range of her baseline.  Glucose is 107. 

Calcium 10.4, phosphorus 3.2, magnesium 1.9.



MEDICATIONS:  Medication list reviewed.  The patient is currently on

Aldactone, Apresoline, Calan, losartan, Ecotrin, Lopressor, MiraLax,

Pepcid, Protonix, Silvadene cream, Tylenol p.r.n. and Xanax.



ASSESSMENT:

1.  Improving blood pressure control, but still elevated blood pressure

readings in the morning.  Perhaps, this morning associated with

supraventricular tachycardia and palpitations.  The patient was evaluated

by Cardiology.  The patient was started on low-dose beta-blocker therapy by

Cardiology.  She continues on verapamil, losartan and hydralazine.  I once

again discussed the possibility of using a more potent longer  acting 
angiotensin

receptor blocker upon discharge from the hospital perhaps generic Benicar

or Edarbi.

2.  History of renal artery stenosis with bilateral renal artery stenting.

3.  Hyperlipidemia, controlled with diet therapy.

4.  History of palpitations being evaluated by Cardiology.

5.  Perhaps, mild chronic kidney disease stage 2.



PLAN:  Discussed with the patient and her daughter.  Continue present

antihypertensive medication.



Suggest substitution of a more prolong acting angiotensin receptor blocker

upon discharge from hospital.

Continue present calcium channel blocker, hydralazine, and low-dose

beta-blocker therapy.

Close renal/hypertension followup during hospitalization and upon

discharge, Dr. Alfred will monitor the patient closely in the outpatient

setting.







__________________________________________

Boni Davis MD





DD:  12/04/2018 16:22:01

DT:  12/04/2018 16:29:10

Job # [id number]

[id number]

## 2018-12-04 NOTE — CARD
--------------- APPROVED REPORT --------------





Date of service: 12/04/2018



EKG Measurement

Heart [id number]

VA [id number]

[id number]

[id number]

[id number]



<Conclusion>

Normal sinus rhythm

Moderate voltage criteria for LVH, may be normal variant

Borderline ECG

## 2018-12-04 NOTE — US
PROCEDURE:  Bilateral carotid artery duplex ultrasound 



HISTORY:

Carotid stenosis 



PHYSICIAN(S):  Zia Gordon MD.



TECHNIQUE:

Duplex sonography and color-flow Doppler were used to evaluate the 

carotid bifurcations and limited segments of the vertebral arteries 

bilaterally.  The exam is somewhat limited by tortuous vessels 



FINDINGS:

There is  moderate focal heterogeneous plaque noted at the carotid 

bifurcations bilaterally. The peak systolic velocity in the proximal 

right internal carotid artery is 88 cm/sec. This corresponds to a 20 

to 39% proximal right ICA stenosis. Normal systolic velocities are 

noted in the proximal right external carotid artery. There is 

antegrade flow in the right vertebral artery.



The peak systolic velocity in the proximal left internal carotid 

artery is 77 cm/sec. This corresponds to a 20 to 39% proximal left 

ICA stenosis. Normal systolic velocities are noted in the proximal 

left external carotid artery. There is antegrade flow in the dominant 

left vertebral artery.



IMPRESSION:

1. Bilateral 20-39% proximal ICA stenoses.



2. Antegrade flow in both vertebral arteries.

## 2018-12-04 NOTE — CP.PCM.PN
Subjective





- Date & Time of Evaluation


Date of Evaluation: 12/04/18


Time of Evaluation: 06:40





- Subjective


Subjective: 








 No distress, lying in bed, awake,alert





Reason for consultation and follow up: Cardiac evaluation of palpitation and 

hypokalemia. History of hypertension, renal artery stenosis





Seen and examined by me and Dr. Mcgraw





Objective





- Vital Signs/Intake and Output


Vital Signs (last 24 hours): 


                                        











Temp Pulse Resp BP Pulse Ox


 


 97.7 F   61   20   181/82 H  95 


 


 12/04/18 05:58  12/04/18 06:00  12/04/18 05:58  12/04/18 05:59  12/04/18 05:58








Intake and Output: 


                                        











 12/04/18 12/04/18





 06:59 18:59


 


Intake Total 120 


 


Balance 120 














- Medications


Medications: 


                               Current Medications





Acetaminophen (Tylenol 325mg Tab)  650 mg PO Q6H PRN


   PRN Reason: Fever >100.4 F


Alprazolam (Xanax)  0.25 mg PO HS [facility]; Protocol


   Stop: 12/07/18 22:01


   Last Admin: 12/03/18 22:21 Dose:  Not Given


Aspirin (Ecotrin)  81 mg PO DAILY [facility]


   Last Admin: 12/03/18 09:49 Dose:  81 mg


Famotidine (Pepcid)  20 mg PO HS [facility]


   Last Admin: 12/03/18 22:19 Dose:  20 mg


Hydralazine HCl (Apresoline)  10 mg PO QID PRN


   PRN Reason: for sbo>170


   Last Admin: 12/04/18 05:59 Dose:  10 mg


Hydralazine HCl (Apresoline)  50 mg PO BID [facility]


   Last Admin: 12/03/18 17:25 Dose:  50 mg


Losartan Potassium (Cozaar)  100 mg PO DAILY [facility]


   Last Admin: 12/03/18 09:52 Dose:  100 mg


Metoprolol Tartrate (Lopressor)  12.5 mg PO BID [facility]


   Last Admin: 12/03/18 17:24 Dose:  12.5 mg


Pantoprazole Sodium (Protonix Ec Tab)  40 mg PO 0630 [facility]


   Last Admin: 12/04/18 05:38 Dose:  40 mg


Polyethylene Glycol (Miralax)  17 gm PO BID [facility]


   Last Admin: 12/03/18 17:24 Dose:  17 gm


Silver Sulfadiazine (Silvadene 1% 25 Gm)  0 gm TP BID [facility]


   Last Admin: 12/03/18 17:29 Dose:  25 gm


Spironolactone (Aldactone)  25 mg PO BID [facility]


   Last Admin: 12/03/18 17:24 Dose:  25 mg


Verapamil HCl (Calan Sr Tab)  240 mg PO DAILY [facility]


   Last Admin: 12/03/18 09:52 Dose:  240 mg











- Labs


Labs: 


                                        





                                 12/03/18 05:35 





                                 12/03/18 05:35 





                                        











PT  12.7 SECONDS (9.4-12.5)  H  11/27/18  16:00    


 


INR  1.11   11/27/18  16:00    


 


APTT  25.3 Seconds (25.1-36.5)   11/27/18  16:00    














Assessment and Plan





- Assessment and Plan (Free Text)


Assessment: 





An 85 year old female who came in to the ER due to palpitations. History of 

uncontrolled hypertension,non obstructive coronary artery disease, cardiac cath 

was done in 1988 and 1997, hyperlipidemia, recent renal artery stenosis with 

bilateral  stent placed last 11/14/18. Recent echo 11/10/18 showed  normal LV 

size, normal LV function. Admitted for hypotension and severe hypokale

farideh.Replenished potassium as needed,Adjusted medications for antihypertension. 

Still feeling of palpitation inspite of correction of hypokalemia, Placed on 

holter monitor to rule out arrythmias,Renal ultrasound done with patent renal 

stents. Synthroid discontinued as per patient request claiming it seems that is 

causing palpitation. Holter monitor,results unremarkable, maximum heart rate 111

beats/min,minimum 47/min. with rare APC's/VPC's. 


Seen by GI for epigastric pain, Out patient EGD per GI. Stress test done. stress

test done yesterday. During the test patient complaints of palpitation, EKG 

showed NSR with some APC's. started on low dose Lopressor.





Plan: 





Stress test done, negative for ischemia, 


 fixed inferolateral defect suggestive of 


 myocardial injury or infarct,  Normal LV function. LVEF 75%


Complaints of palpitation during test, started on low dose Lopressor


EKG at that time NSR with some APC's


No distress,Denies chest pain or shortness of breath


Blood pressure elevated today 's, RN will give morning medications


Heart rate  NSR 70-80's


On Clonidine 0.2 mg daily, ASA 81 mg daily


 Cozaar 100 mg daily, Verapamil 240 mg daily, Hydralazine 10 mg QID PRN


 Aldactone 25 mg BID, Lopressor 12.5 mg BID


Continue current mSeen by GI for epigastric pain


Out patient EGD per GIedications


Continue current treatment


Chart reviewed


Will follow up





Plan and treatment discussed with Dr. Mcgraw

## 2018-12-04 NOTE — PN
DATE:  12/04/2018



REASON FOR CONSULTATION AND FOLLOWUP:  Cardiac evaluation, palpitation,

hypokalemia, history of hypertension, history of renal artery stenosis,

normal myocardial perfusion scan, no ischemia (_____ effect).



The patient had elevated blood pressure in the morning, so a.m. dose

antihypertensive medication was given earlier.  The patient had a Holter

earlier that showed no significant arrhythmia.  Holter dated 11/29/2018

showed minimum heart rate of 39, maximum heart rate of 115, average heart

rate is 56.  Heart rate was 39 when the patient was sleeping at 4:00 a.m. 

Occasional PVCs noted.  Yesterday, the patient underwent a stress thallium.

During the Lexiscan, the patient also complained of throbbing sensation and

palpitation, but telemetry monitor did not show any arrhythmia except

occasional APCs and maximum heart rate was 100 during that, but still the

patient feels throbbing and heart rate racing.  This morning also just now

the patient felt palpitation.  In the morning, the patient was stable and

heart rate was found to be 100.  So the patient becomes very sensitive when

the heart rate goes to 100, feels palpitation.  Yesterday, started

metoprolol 12.5 mg twice a day and first dose she got yesterday, felt okay.

Just now, the patient started complaining again, so we will increase

metoprolol to 25 orally twice a day.  Discussed with the daughter in

length, Teresa, on telephone number [phone].  She was concerned about

the carotid artery because the patient did not have in the past and she had

severe renal artery stenosis and sometimes she complained of dizziness.  So

we will order carotid duplex ultrasound and we will increase metoprolol to

25 orally twice a day and space out the medication and we will follow

closely.  If remains stable in the next 24 hours, possibly discharge

planning.



This note is in addition to dictated by our nurse practitioner, Jennifer Moreno.



LABORATORY DATA:  Today's lab essentially okay.  Hemoglobin 12.3,

hematocrit 39.1, platelet count 240.  Chemistry shows creatinine 1.3 today

and potassium 4.5.



We will follow with you.



Thank you  _____ for providing us the opportunity in taking care of the

patient, Lynda Abdul.







__________________________________________

Mary Mcgraw MD





DD:  12/04/2018 10:12:53

DT:  12/04/2018 11:34:17

Job # [id number]

## 2018-12-05 LAB
BASOPHILS # BLD AUTO: 0.02 K/MM3 (ref 0–2)
BASOPHILS NFR BLD: 0.2 % (ref 0–3)
BUN SERPL-MCNC: 35 MG/DL (ref 7–21)
CALCIUM SERPL-MCNC: 10.2 MG/DL (ref 8.4–10.5)
EOSINOPHIL # BLD: 0.5 10*3/UL (ref 0–0.7)
EOSINOPHIL NFR BLD: 5.1 % (ref 1.5–5)
ERYTHROCYTE [DISTWIDTH] IN BLOOD BY AUTOMATED COUNT: 13.7 % (ref 11.5–14.5)
GFR NON-AFRICAN AMERICAN: 31
GRANULOCYTES # BLD: 5.67 10*3/UL (ref 1.4–6.5)
GRANULOCYTES NFR BLD: 58 % (ref 50–68)
HGB BLD-MCNC: 11.7 G/DL (ref 12–16)
LYMPHOCYTES # BLD: 2.8 10*3/UL (ref 1.2–3.4)
LYMPHOCYTES NFR BLD AUTO: 28.4 % (ref 22–35)
MCH RBC QN AUTO: 29.5 PG (ref 25–35)
MCHC RBC AUTO-ENTMCNC: 31.4 G/DL (ref 31–37)
MCV RBC AUTO: 94.2 FL (ref 80–105)
MONOCYTES # BLD AUTO: 0.8 10*3/UL (ref 0.1–0.6)
MONOCYTES NFR BLD: 8.3 % (ref 1–6)
PLATELET # BLD: 224 10^3/UL (ref 120–450)
PMV BLD AUTO: 8.8 FL (ref 7–11)
RBC # BLD AUTO: 3.96 10^6/UL (ref 3.5–6.1)
WBC # BLD AUTO: 9.8 10^3/UL (ref 4.5–11)

## 2018-12-05 RX ADMIN — POLYETHYLENE GLYCOL 3350 SCH GM: 17 POWDER, FOR SOLUTION ORAL at 09:02

## 2018-12-05 RX ADMIN — SILVER SULFADIAZINE SCH: 10 CREAM TOPICAL at 18:21

## 2018-12-05 RX ADMIN — POLYETHYLENE GLYCOL 3350 SCH GM: 17 POWDER, FOR SOLUTION ORAL at 17:44

## 2018-12-05 RX ADMIN — SILVER SULFADIAZINE SCH GM: 10 CREAM TOPICAL at 11:08

## 2018-12-05 RX ADMIN — PANTOPRAZOLE SODIUM SCH MG: 40 TABLET, DELAYED RELEASE ORAL at 05:22

## 2018-12-05 RX ADMIN — VERAPAMIL HYDROCHLORIDE SCH MG: 240 TABLET, FILM COATED, EXTENDED RELEASE ORAL at 09:00

## 2018-12-05 NOTE — PN
DATE:  12/04/2018



SUBJECTIVE:  The patient is 85 years old, seen and examined.  She has

similar symptoms like yesterday this morning when she was ready to eat. 

She felt heat and pressure in her forehead and she was felt to have

palpitations and she developed tremor.  She was given metoprolol and her

health condition subsided in an hour.  She went for carotid Doppler.

Otherwise, she states when I examined the patient, she is doing well.  No

significant complaints.



PHYSICAL EXAMINATION:

VITAL SIGNS:  She is afebrile, pulse 60, respirations 20, and blood

pressure 125/65.

LUNGS:  Bilateral fair airflow.  No rhonchi or crackles.

HEART:  S1, S2 audible.

ABDOMEN:  Soft, nontender.  No rebound.  No guarding.

NEUROLOGICAL:  The patient is awake, alert, and oriented, communicative.



LABORATORY EXAM:  Carotid Doppler, bilateral 20% to 39% ICA.



ASSESSMENT AND PLAN:

1.  Uncontrolled hypertension, etiology still unclear, however it is stable

today afternoon and did have spike in the morning.

2.  Renal artery stenosis, status post angioplasty.

3.  History of gastritis.



PLAN:  Currently, the patient is on spironolactone 20 mg twice a day.  She

is on hydralazine 50 mg twice a day.  She is on verapamil 240 daily.  She

is on Cozaar 100 daily.  She is on aspirin 81 daily.  She is on metoprolol

25 twice a day.  She is on Protonix 40 daily.  We will follow up in next 24

hours and we will re-evaluate in a.m.





__________________________________________

Pamela Ram MD





DD:  12/04/2018 19:20:44

DT:  12/04/2018 22:44:13

Job # [id number]

## 2018-12-05 NOTE — PN
DATE:  12/05/2018



SUBJECTIVE:  The patient is 85-year-old, seen and examined, had similar

episode this morning.  She has headache, heat feeling in her temple

followed by palpitation.  Yesterday afternoon, her blood pressure was

running good.



PHYSICAL EXAMINATION

VITAL SIGNS:  Today, her blood pressure max to 185/85 around 9 o'clock,

pulse 67, respirations 20.

LUNGS:  Bilateral fair airflow.  No rhonchi or crackles.

HEART:  S1 and S2 audible.

ABDOMEN:  Soft, nontender.  No rebound, no guarding.

NEUROLOGIC:  The patient is awake, alert, oriented, communicative.



LABORATORY DATA:  WBC is 9.8, hemoglobin 11.7, hematocrit 37.3, platelets

224.  Chemistry:  Sodium 136, potassium 4.8, chloride 106, CO2 25, BUN 35,

creatinine 1.6, blood sugar of 93.



ASSESSMENT:

1.  Fluctuating blood pressures with palpitation.

2.  Renal insufficiency.

3.  Renal artery stenosis, status post angioplasty.

4.  Constipation that has relieved.



PLAN:  We will add clonidine at bedtime and monitor her blood pressure for

the next 24 hours, and we will make adjustment according to her response.







__________________________________________

Pamela Ram MD





DD:  12/05/2018 12:15:12

DT:  12/05/2018 12:59:23

Job # [id number]

## 2018-12-05 NOTE — PN
DATE:  12/05/2018



REASON FOR CONSULTATION AND FOLLOWUP:  Cardiac evaluation, palpitation,

hypokalemia, history of hypertension, renal artery stenosis.  Patient feels

a lot better, blood pressure is relatively well controlled.  No further

episode of bradycardia noted.  Length of discussion done yesterday with

daughter Teresa who is a chiropractor.  Holter did not show any significant

arrhythmia.  Patient started on beta blocker 25 mg p.o. twice a day.  Well

tolerating lowest heart rate of 68, this morning blood pressure at 5 a.m.

was 137/66.  Daughter requested to do the carotid artery Duplex scan which

was ordered and found to be mild bilateral _____ stenosis, normal antegrade

fluid vertebral arteries.  Today's labs WBC 9.8, hemoglobin 11, hematocrit

37.3.  BUN 31 and creatinine 1.6.



RECOMMENDATION:  Continue aggressive medical treatment, followup

electrolytes, possible discharge.  Discussed with Dr. Ram at length

yesterday.



Thank you Dr. Ram for providing us the opportunity in taking care of

Franko Hoewll.  No further cardiac workup is planned.  His stress test

essentially _____, no reversible ischemia.  Discussed the stress test

result with the patient, the patient's daughter Teresa and with Dr. Ram

because it is reported _____, no reversible ischemia.  Ejection fraction is

75%.







__________________________________________

Mary Mcgraw MD





DD:  12/05/2018 8:48:38

DT:  12/05/2018 9:55:48

Job # [id number]

## 2018-12-05 NOTE — CP.PCM.PN
Subjective





- Date & Time of Evaluation


Date of Evaluation: 12/05/18


Time of Evaluation: 06:25





- Subjective


Subjective: 








Sleeping but easily awaken, No distress, lying in bed





Reason for consultation and follow up: Cardiac evaluation of palpitation and hyp

okalemia. History of hypertension, renal artery stenosis





Seen and examined by me and Dr. Mcgraw





Objective





- Vital Signs/Intake and Output


Vital Signs (last 24 hours): 


                                        











Temp Pulse Resp BP Pulse Ox


 


 97.7 F   58 L  20   137/66   98 


 


 12/05/18 00:01  12/05/18 06:00  12/05/18 00:01  12/05/18 00:01  12/05/18 00:01








Intake and Output: 


                                        











 12/04/18 12/05/18





 18:59 06:59


 


Intake Total 720 150


 


Balance 720 150














- Medications


Medications: 


                               Current Medications





Acetaminophen (Tylenol 325mg Tab)  650 mg PO Q6H PRN


   PRN Reason: Fever >100.4 F


Alprazolam (Xanax)  0.25 mg PO HS [facility]; Protocol


   Stop: 12/07/18 22:01


   Last Admin: 12/04/18 22:08 Dose:  Not Given


Aspirin (Ecotrin)  81 mg PO DAILY [facility]


   Last Admin: 12/04/18 09:09 Dose:  81 mg


Famotidine (Pepcid)  20 mg PO HS [facility]


   Last Admin: 12/04/18 21:34 Dose:  20 mg


Hydralazine HCl (Apresoline)  10 mg PO QID PRN


   PRN Reason: for sbo>170


   Last Admin: 12/04/18 05:59 Dose:  10 mg


Hydralazine HCl (Apresoline)  50 mg PO BID [facility]


   Last Admin: 12/04/18 17:40 Dose:  Not Given


Losartan Potassium (Cozaar)  100 mg PO DAILY [facility]


   Last Admin: 12/04/18 09:08 Dose:  100 mg


Metoprolol Tartrate (Lopressor)  25 mg PO BID [facility]


   Last Admin: 12/04/18 17:41 Dose:  25 mg


Pantoprazole Sodium (Protonix Ec Tab)  40 mg PO 0630 [facility]


   Last Admin: 12/05/18 05:22 Dose:  40 mg


Polyethylene Glycol (Miralax)  17 gm PO BID [facility]


   Last Admin: 12/04/18 17:40 Dose:  17 gm


Silver Sulfadiazine (Silvadene 1% 25 Gm)  0 gm TP BID [facility]


   Last Admin: 12/04/18 17:45 Dose:  Not Given


Spironolactone (Aldactone)  25 mg PO BID [facility]


   Last Admin: 12/04/18 17:41 Dose:  25 mg


Verapamil HCl (Calan Sr Tab)  240 mg PO DAILY [facility]


   Last Admin: 12/04/18 09:06 Dose:  240 mg











- Labs


Labs: 


                                        





                                 12/05/18 05:45 





                                 12/03/18 05:35 





                                        











PT  12.7 SECONDS (9.4-12.5)  H  11/27/18  16:00    


 


INR  1.11   11/27/18  16:00    


 


APTT  25.3 Seconds (25.1-36.5)   11/27/18  16:00    














- Constitutional


Appears: Non-toxic, No Acute Distress





- Head Exam


Head Exam: NORMAL INSPECTION, NORMOCEPHALIC





- Eye Exam


Eye Exam: Normal appearance


Pupil Exam: NORMAL ACCOMODATION





- ENT Exam


ENT Exam: Mucous Membranes Moist, Normal Exam





- Respiratory Exam


Respiratory Exam: Clear to Ausculation Bilateral, NORMAL BREATHING PATTERN





- Cardiovascular Exam


Cardiovascular Exam: +S1, +S2


Additional comments: 





No JVD





- GI/Abdominal Exam


GI & Abdominal Exam: Soft, Normal Bowel Sounds





- Extremities Exam


Extremities Exam: Full ROM, Normal Capillary Refill


Additional comments: 





1+edema on right leg





- Neurological Exam


Neurological Exam: Alert, Awake, Oriented x3





- Psychiatric Exam


Psychiatric exam: Normal Affect, Normal Mood





- Skin


Skin Exam: Dry, Normal Color, Warm





Assessment and Plan





- Assessment and Plan (Free Text)


Assessment: 








An 85 year old female who came in to the ER due to palpitations. History of 

uncontrolled hypertension,non obstructive coronary artery disease, cardiac cath 

was done in 1988 and 1997, hyperlipidemia, recent renal artery stenosis with 

bilateral  stent placed last 11/14/18. Recent echo 11/10/18 showed  normal LV 

size, normal LV function. Admitted for hypotension and severe hypokalemia

.Replenished potassium as needed,Adjusted medications for antihypertension. 

Still feeling of palpitation inspite of correction of hypokalemia, Placed on 

holter monitor to rule out arrythmias,Renal ultrasound done with patent renal 

stents. Synthroid discontinued as per patient request claiming it seems that is 

causing palpitation. Holter monitor,results unremarkable, maximum heart rate 111

beats/min,minimum 47/min. with rare APC's/VPC's. 


Seen by GI for epigastric pain, Out patient EGD per GI. Stress test done. stress

test done yesterday. Stress test done, negative for ischemia, 


fixed inferolateral defect suggestive of  myocardial injury or infarct,  Normal 

LV function. LVEF 75%, Complaints of palpitation during test, started on low 

dose Lopressor,EKG at that time NSR with some APC's, started on low dose 

Lopressor. Complaining of palpitations off and on. Yesterday heart rate was up 

to 100/min and symptomatic, Lopressor increased to 25 mg BID.








Plan: 





No distress,Denies chest pain or shortness of breath


Blood pressure stable today 's-140's


Heart rate  NSR 50's-60's


Yesterday, heart rate was on 100's and symptomatic, feels heart throbbing


Increased Lopressor to 25 mg BID, seems sensitive when heart rate goes up to 

100's


On Clonidine 0.2 mg daily, ASA 81 mg daily


 Cozaar 100 mg daily, Verapamil 240 mg daily, Hydralazine 10 mg QID PRN


 Aldactone 25 mg BID, Lopressor 12.5 mg BID


Continue current medications


Out patient EGD per GI 


Carotid studies done and results unremarkable 


 (daughter claimed had history of dizziness)


Continue current treatment


If blood pressure remains stable, may discharge from cardiac standpoint


Chart reviewed


Will follow up





Plan and treatment discussed with Dr. Mcgraw

## 2018-12-06 VITALS — OXYGEN SATURATION: 97 %

## 2018-12-06 LAB
APPEARANCE UR: CLEAR
BILIRUB UR-MCNC: NEGATIVE MG/DL
COLOR UR: YELLOW
GLUCOSE UR STRIP-MCNC: NEGATIVE MG/DL
LEUKOCYTE ESTERASE UR-ACNC: NEGATIVE LEU/UL
PH UR STRIP: 7 [PH] (ref 4.7–8)
PROT UR STRIP-MCNC: NEGATIVE MG/DL
RBC # UR STRIP: NEGATIVE /UL
SP GR UR STRIP: 1.01 (ref 1–1.03)
UROBILINOGEN UR STRIP-ACNC: 0.2 E.U./DL

## 2018-12-06 RX ADMIN — VERAPAMIL HYDROCHLORIDE SCH MG: 240 TABLET, FILM COATED, EXTENDED RELEASE ORAL at 11:32

## 2018-12-06 RX ADMIN — SILVER SULFADIAZINE SCH GM: 10 CREAM TOPICAL at 15:46

## 2018-12-06 RX ADMIN — PANTOPRAZOLE SODIUM SCH MG: 40 TABLET, DELAYED RELEASE ORAL at 05:35

## 2018-12-06 RX ADMIN — SILVER SULFADIAZINE SCH: 10 CREAM TOPICAL at 18:03

## 2018-12-06 RX ADMIN — POLYETHYLENE GLYCOL 3350 SCH GM: 17 POWDER, FOR SOLUTION ORAL at 17:47

## 2018-12-06 RX ADMIN — POLYETHYLENE GLYCOL 3350 SCH GM: 17 POWDER, FOR SOLUTION ORAL at 10:35

## 2018-12-06 NOTE — PN
DATE:  12/05/2018



SUBJECTIVE:  The patient is seen sitting in bed.  She is awake, she is

alert.



PHYSICAL EXAMINATION:

GENERAL:  Elderly lady, sitting in bed.

VITAL SIGNS:  Blood pressure 185/85, heart rate 57, respiratory rate 20,

temperature 97.9.

HEENT:  Normocephalic, atraumatic, positive pallor.

NECK:  Supple, no JVD.

LUNGS:  Bilateral equal air entry, bilateral equal expansion.

CARDIAC:  S1 and S2, regular rate and rhythm, no murmur, no rub.

ABDOMEN:  Obese, distended, soft, nontender, bowel sounds present.

EXTREMITIES:  No lower extremity edema.



LABORATORY DATA:  WBC 9.8, hemoglobin 11.7, hematocrit 37, platelets 224. 

Sodium 136, potassium 4.8, chloride 106, CO2 25, BUN 35, creatinine 1.6,

glucose 93, calcium 10.2, phosphorus 3.5, magnesium 2.1.



CURRENT MEDICATIONS:  Aldactone 25 b.i.d., Apresoline 50 b.i.d., Calan 240,

Catapres 0.2, losartan 100, Ecotrin 81, Lopressor 25 b.i.d., MiraLax 17 g

b.i.d.



ASSESSMENT:

1.  Labile blood pressure.

2.  Recent renal artery stenting bilaterally.

3.  Hyperlipidemia.

4.  Palpitations.



PLAN:

1.  Agree with clonidine at bedtime

2.  Continue losartan, Cardizem, hydralazine, and now beta blocker as per

Cardiology.

3.  Echocardiogram results noted, normal ejection fraction, normal right

ventricular function.

4..  Follow up plasma metanephrines.







__________________________________________

Katiuska Alfred MD



DD:  12/05/2018 21:49:14

DT:  12/05/2018 21:51:36

Job # [id number]

## 2018-12-06 NOTE — PN
DATE:  12/06/2018



SUBJECTIVE:  The patient is 85 years old, seen and examined, doing well. 

This morning, she felt litter better although she did have a brief episode

of headache and feeling of heat in her forehead, no palpitation.  This

happened yesterday.



PHYSICAL EXAMINATION:

VITAL SIGNS:  She is afebrile, pulse 66, respirations 18, blood pressure

135/77.

LUNGS:  Bilateral fair airflow.  No rhonchi or crackles.

ABDOMEN:  Soft, nontender.  No rebound, no guarding.

NEUROLOGIC:  The patient is awake, alert, oriented, communicative.



LABORATORY DATA:  WBC is 9.8, hemoglobin 11.7, hematocrit 37, and platelets

224.  Chemistry:  Sodium 136, potassium 4.8, chloride 106, CO2 25, ,

creatinine 1.6, and blood sugar 93.



ASSESSMENT:

1.  Fluctuating blood pressure.

2.  Status post renal artery angioplasty.

3.  Hypertension.

4.  Hyperlipidemia.

5.  Palpitation.



PLAN:  We will continue current regimen, she seems to be stable on this

regimen.  We will watch for another 24 hours and discharge plan for

morning.







__________________________________________

Pamela Ram MD





DD:  12/06/2018 15:22:26

DT:  12/06/2018 19:24:27

Job # [id number]

## 2018-12-06 NOTE — CP.PCM.PN
Subjective





- Date & Time of Evaluation


Date of Evaluation: 12/06/18


Time of Evaluation: 06:40





- Subjective


Subjective: 





Easily awaken, no distress, feels okay.lying in bed





Reason for consultation and follow up: Cardiac evaluation of palpitation and 

hypokalemia. History of hypertension, renal artery stenosis





Seen and examined by me and Dr. Mcgraw














Objective





- Vital Signs/Intake and Output


Vital Signs (last 24 hours): 


                                        











Temp Pulse Resp BP Pulse Ox


 


 97.9 F   53 L  20   168/81 H  98 


 


 12/05/18 16:43  12/06/18 06:00  12/05/18 16:43  12/05/18 21:44  12/05/18 16:43








Intake and Output: 


                                        











 12/06/18 12/06/18





 06:59 18:59


 


Intake Total 300 


 


Balance 300 














- Medications


Medications: 


                               Current Medications





Acetaminophen (Tylenol 325mg Tab)  650 mg PO Q6H PRN


   PRN Reason: Fever >100.4 F


Alprazolam (Xanax)  0.25 mg PO HS [facility]; Protocol


   Stop: 12/07/18 22:01


   Last Admin: 12/05/18 21:51 Dose:  Not Given


Aspirin (Ecotrin)  81 mg PO DAILY [facility]


   Last Admin: 12/05/18 09:00 Dose:  81 mg


Clonidine HCl (Catapres)  0.2 mg PO HS [facility]


   Last Admin: 12/05/18 21:44 Dose:  0.2 mg


Famotidine (Pepcid)  20 mg PO HS [facility]


   Last Admin: 12/05/18 21:44 Dose:  20 mg


Hydralazine HCl (Apresoline)  10 mg PO QID PRN


   PRN Reason: for sbo>170


   Last Admin: 12/04/18 05:59 Dose:  10 mg


Hydralazine HCl (Apresoline)  50 mg PO BID [facility]


   Last Admin: 12/05/18 17:49 Dose:  Not Given


Losartan Potassium (Cozaar)  100 mg PO DAILY [facility]


   Last Admin: 12/05/18 09:01 Dose:  100 mg


Metoprolol Tartrate (Lopressor)  25 mg PO BID [facility]


   Last Admin: 12/05/18 17:44 Dose:  25 mg


Pantoprazole Sodium (Protonix Ec Tab)  40 mg PO 0630 [facility]


   Last Admin: 12/06/18 05:35 Dose:  40 mg


Polyethylene Glycol (Miralax)  17 gm PO BID [facility]


   Last Admin: 12/05/18 17:44 Dose:  17 gm


Silver Sulfadiazine (Silvadene 1% 25 Gm)  0 gm TP BID [facility]


   Last Admin: 12/05/18 18:21 Dose:  Not Given


Spironolactone (Aldactone)  25 mg PO BID [facility]


   Last Admin: 12/05/18 17:43 Dose:  25 mg


Verapamil HCl (Calan Sr Tab)  240 mg PO DAILY [facility]


   Last Admin: 12/05/18 09:00 Dose:  240 mg











- Labs


Labs: 


                                        





                                 12/05/18 05:45 





                                 12/05/18 05:45 





                                        











PT  12.7 SECONDS (9.4-12.5)  H  11/27/18  16:00    


 


INR  1.11   11/27/18  16:00    


 


APTT  25.3 Seconds (25.1-36.5)   11/27/18  16:00    














- Constitutional


Appears: Non-toxic, No Acute Distress





- Head Exam


Head Exam: NORMAL INSPECTION, NORMOCEPHALIC





- Eye Exam


Eye Exam: Normal appearance


Pupil Exam: NORMAL ACCOMODATION





- ENT Exam


ENT Exam: Mucous Membranes Moist, Normal Exam





- Respiratory Exam


Respiratory Exam: Decreased Breath Sounds, Clear to Ausculation Bilateral, 

NORMAL BREATHING PATTERN





- Cardiovascular Exam


Cardiovascular Exam: +S1, +S2





- GI/Abdominal Exam


GI & Abdominal Exam: Soft, Normal Bowel Sounds





- Extremities Exam


Extremities Exam: Full ROM





- Neurological Exam


Neurological Exam: Alert, Awake, Oriented x3





- Psychiatric Exam


Psychiatric exam: Normal Affect, Normal Mood





- Skin


Skin Exam: Dry, Normal Color, Warm





Assessment and Plan





- Assessment and Plan (Free Text)


Assessment: 





n 85 year old female who came in to the ER due to palpitations. History of 

uncontrolled hypertension,non obstructive coronary artery disease, cardiac cath 

was done in 1988 and 1997, hyperlipidemia, recent renal artery stenosis with 

bilateral  stent placed last 11/14/18. Recent echo 11/10/18 showed  normal LV 

size, normal LV function. Admitted for hypotension and severe 

hypokalemia.Replenished potassium as needed,Adjusted medications for 

antihypertension. Still feeling of palpitation inspite of correction of 

hypokalemia, Placed on holter monitor to rule out arrythmias,Renal ultrasound 

done with patent renal stents. Synthroid discontinued as per patient request 

claiming it seems that is causing palpitation. Holter monitor,results 

unremarkable, maximum heart rate 111 beats/min,minimum 47/min. with rare 

APC's/VPC's. 


Seen by GI for epigastric pain, Out patient EGD per GI. Stress test done. stress

test done yesterday. Stress test done, negative for ischemia, 


fixed inferolateral defect suggestive of  myocardial injury or infarct,  Normal 

LV function. LVEF 75%, Complaints of palpitation during test, started on low dos

e Lopressor,EKG at that time NSR with some APC's, started on low dose Lopressor.

Complaining of palpitations off and on. Yesterday heart rate was up to 100/min 

and symptomatic, Lopressor increased to 25 mg BID.Added Clonidine at night.


Carotid studies done and results unremarkable, (as per daughter request)





Plan: 





No distress, denies palpitation


Labile blood pressure


Added and increased Lopressor to 25 mg BID


Added Clonidine at night


Heart rate  NSR 50's-60's


On Clonidine 0.2 mg HS, ASA 81 mg daily


 Cozaar 100 mg daily, Verapamil 240 mg daily, Hydralazine 50 mg BID,


 Aldactone 25 mg BID, Lopressor 25mg BID


Continue current medications


Out patient EGD per GI 


Continue current treatment


Chart reviewed


Will follow up





Plan and treatment discussed with Dr. Mcgraw

## 2018-12-07 VITALS — SYSTOLIC BLOOD PRESSURE: 132 MMHG | TEMPERATURE: 97.7 F | DIASTOLIC BLOOD PRESSURE: 59 MMHG

## 2018-12-07 VITALS — HEART RATE: 58 BPM

## 2018-12-07 LAB
BUN SERPL-MCNC: 39 MG/DL (ref 7–21)
CALCIUM SERPL-MCNC: 9.8 MG/DL (ref 8.4–10.5)
ERYTHROCYTE [DISTWIDTH] IN BLOOD BY AUTOMATED COUNT: 13.4 % (ref 11.5–14.5)
GFR NON-AFRICAN AMERICAN: 36
HGB BLD-MCNC: 11.1 G/DL (ref 12–16)
MCH RBC QN AUTO: 30.2 PG (ref 25–35)
MCHC RBC AUTO-ENTMCNC: 32.1 G/DL (ref 31–37)
MCV RBC AUTO: 94 FL (ref 80–105)
PLATELET # BLD: 212 10^3/UL (ref 120–450)
PMV BLD AUTO: 8.5 FL (ref 7–11)
RBC # BLD AUTO: 3.68 10^6/UL (ref 3.5–6.1)
WBC # BLD AUTO: 8.2 10^3/UL (ref 4.5–11)

## 2018-12-07 RX ADMIN — PANTOPRAZOLE SODIUM SCH MG: 40 TABLET, DELAYED RELEASE ORAL at 05:56

## 2018-12-07 RX ADMIN — VERAPAMIL HYDROCHLORIDE SCH MG: 240 TABLET, FILM COATED, EXTENDED RELEASE ORAL at 09:50

## 2018-12-07 RX ADMIN — POLYETHYLENE GLYCOL 3350 SCH GM: 17 POWDER, FOR SOLUTION ORAL at 09:51

## 2018-12-07 RX ADMIN — SILVER SULFADIAZINE SCH: 10 CREAM TOPICAL at 09:51

## 2018-12-07 NOTE — DS
HISTORY OF PRESENT ILLNESS:  The patient is an 85-year-old, seen and

examined, sitting in chair, and seems to be comfortable.  No episode of

palpitation or high blood pressure in the morning.  Doing well.  Eating and

tolerating.  Has regular bowel movements.



PHYSICAL EXAMINATION:

VITAL SIGNS:  She is afebrile, pulse _____, respirations 20, and blood

pressure 132/59.

LUNGS:  Bilateral fair airflow.  No rhonchi or crackles.

HEART:  S1 and S2 audible.

ABDOMEN:  Soft and nontender.  No rebound.  No guarding.

NEUROLOGIC:  She is awake, alert, oriented, and communicative.



LABORATORY DATA:  WBC 8.2, hemoglobin 11, hematocrit 34.6, and platelets of

212.  Chemistry; sodium 135, potassium 5, chloride 106, CO2 of 23, BUN 39,

creatinine 1.4, and blood sugar of 99.



ASSESSMENT:

1.  Fluctuating blood pressure seems to be stable today.

2.  Renal artery stenosis, status post angioplasty and stent placement.

3.  Hyperlipidemia.

4.  Peptic ulcer disease.



PLAN:  The patient is being discharged on Aldactone 25 twice a day,

metoprolol 25 twice a day, losartan 100 mg daily, verapamil 240 daily, she

is on hydralazine 50 mg twice a day, and Pepcid 20 at bedtime and 40 mg in

the morning.  The patient will follow up with her PMD and she will also

follow up with Dr. Alfred.  All the prescriptions were given.  She will

monitor her blood pressure closely.  _____ fluctuation, she will get in

touch with either primary care doctor, [facility] or Dr. Alfred.







__________________________________________

Pamela Ram MD





DD:  12/07/2018 12:01:18

DT:  12/07/2018 15:45:36

Job # [id number]

## 2018-12-07 NOTE — CP.PCM.PN
Subjective





- Date & Time of Evaluation


Date of Evaluation: 12/07/18


Time of Evaluation: 06:30





- Subjective


Subjective: 








Awake, no distress, feels okay. lying in bed, denies headache or palpitations 

now





Reason for consultation and follow up: Cardiac evaluation of palpitation and 

hypokalemia. History of hypertension, renal artery stenosis





Seen and examined by me and Dr. Mcgraw











Objective





- Vital Signs/Intake and Output


Vital Signs (last 24 hours): 


                                        











Temp Pulse Resp BP Pulse Ox


 


 97.8 F   48 L  20   148/67   97 


 


 12/06/18 17:13  12/07/18 02:00  12/06/18 17:13  12/06/18 21:39  12/06/18 17:13








Intake and Output: 


                                        











 12/06/18 12/07/18





 18:59 06:59


 


Intake Total 480 


 


Balance 480 














- Medications


Medications: 


                               Current Medications





Acetaminophen (Tylenol 325mg Tab)  650 mg PO Q6H PRN


   PRN Reason: Fever >100.4 F


Alprazolam (Xanax)  0.25 mg PO HS [facility]; Protocol


   Stop: 12/07/18 22:01


   Last Admin: 12/05/18 21:51 Dose:  Not Given


Aspirin (Ecotrin)  81 mg PO DAILY [facility]


   Last Admin: 12/06/18 10:35 Dose:  81 mg


Clonidine HCl (Catapres)  0.2 mg PO HS [facility]


   Last Admin: 12/06/18 21:39 Dose:  0.2 mg


Famotidine (Pepcid)  20 mg PO HS [facility]


   Last Admin: 12/06/18 21:39 Dose:  20 mg


Hydralazine HCl (Apresoline)  10 mg PO QID PRN


   PRN Reason: for sbo>170


   Last Admin: 12/04/18 05:59 Dose:  10 mg


Hydralazine HCl (Apresoline)  50 mg PO BID [facility]


   Last Admin: 12/06/18 17:47 Dose:  50 mg


Losartan Potassium (Cozaar)  100 mg PO DAILY [facility]


   Last Admin: 12/06/18 11:21 Dose:  Not Given


Metoprolol Tartrate (Lopressor)  25 mg PO BID [facility]


   Last Admin: 12/06/18 17:51 Dose:  25 mg


Pantoprazole Sodium (Protonix Ec Tab)  40 mg PO 0630 [facility]


   Last Admin: 12/07/18 05:56 Dose:  40 mg


Polyethylene Glycol (Miralax)  17 gm PO BID [facility]


   Last Admin: 12/06/18 17:47 Dose:  17 gm


Silver Sulfadiazine (Silvadene 1% 25 Gm)  0 gm TP BID [facility]


   Last Admin: 12/06/18 18:03 Dose:  Not Given


Spironolactone (Aldactone)  25 mg PO BID [facility]


   Last Admin: 12/06/18 17:51 Dose:  25 mg


Verapamil HCl (Calan Sr Tab)  240 mg PO DAILY [facility]


   Last Admin: 12/06/18 11:32 Dose:  240 mg











- Labs


Labs: 


                                        





                                 12/07/18 06:00 





                                 12/07/18 06:00 





                                        











PT  12.7 SECONDS (9.4-12.5)  H  11/27/18  16:00    


 


INR  1.11   11/27/18  16:00    


 


APTT  25.3 Seconds (25.1-36.5)   11/27/18  16:00    














- Constitutional


Appears: Non-toxic, No Acute Distress





- Head Exam


Head Exam: NORMAL INSPECTION, NORMOCEPHALIC





- Eye Exam


Eye Exam: Normal appearance


Pupil Exam: NORMAL ACCOMODATION





- ENT Exam


ENT Exam: Mucous Membranes Moist, Normal Exam





- Respiratory Exam


Respiratory Exam: Clear to Ausculation Bilateral, NORMAL BREATHING PATTERN





- Cardiovascular Exam


Cardiovascular Exam: +S1, +S2





- GI/Abdominal Exam


GI & Abdominal Exam: Soft, Normal Bowel Sounds





- Extremities Exam


Extremities Exam: Full ROM, Normal Capillary Refill


Additional comments: 





right leg trace edema





- Neurological Exam


Neurological Exam: Alert, Awake, Oriented x3





- Psychiatric Exam


Psychiatric exam: Normal Affect, Normal Mood





- Skin


Skin Exam: Dry, Normal Color, Warm





Assessment and Plan





- Assessment and Plan (Free Text)


Assessment: 





n 85 year old female who came in to the ER due to palpitations. History of 

uncontrolled hypertension,non obstructive coronary artery disease, cardiac cath 

was done in 1988 and 1997, hyperlipidemia, recent renal artery stenosis with 

bilateral  stent placed last 11/14/18. Recent echo 11/10/18 showed  normal LV 

size, normal LV function. Admitted for hypotension and severe 

hypokalemia.Replenished potassium as needed,Adjusted medications for 

antihypertension. Still feeling of palpitation inspite of correction of 

hypokalemia, Placed on holter monitor to rule out arrythmias,Renal ultrasound 

done with patent renal stents. Synthroid discontinued as per patient request 

claiming it seems that is causing palpitation. Holter monitor,results 

unremarkable, maximum heart rate 111 beats/min,minimum 47/min. with rare 

APC's/VPC's. 


Seen by GI for epigastric pain, Out patient EGD per GI. Stress test done. stress

test done yesterday. Stress test done, negative for ischemia, 


fixed inferolateral defect suggestive of  myocardial injury or infarct,  Normal 

LV function. LVEF 75%, Complaints of palpitation during test, started on low 

dose Lopressor,EKG at that time NSR with some APC's, started on low dose 

Lopressor. Complaining of palpitations off and on. Yesterday heart rate was up 

to 100/min and symptomatic, Lopressor increased to 25 mg BID.Added Clonidine at 

night.


Carotid studies done and results unremarkable, (as per daughter request).Denies 

palpitation. slight headache yesterday.





Plan: 





Feels better today, denies headache and palpitations


Controlled blood pressure now


Heart rate  NSR 50's-60's,


MCmfortable on this rate not to feel palpitations


On Clonidine 0.2 mg HS, ASA 81 mg daily


 Cozaar 100 mg daily, Verapamil 240 mg daily, Hydralazine 50 mg BID,


 Aldactone 25 mg BID, Lopressor 25mg BID


Continue current medications


Out patient EGD per GI 


Continue current treatment


Discharge planning


Chart reviewed


Will follow up





Plan and treatment discussed with Dr. Mcgraw

## 2018-12-07 NOTE — PN
DATE:  12/06/2018





SUBJECTIVE:  The patient was seen sitting in chair.  She is awake, she is

alert, she is comfortable.  She reports feeling better today.  She reports

she had a brief moment of feeling of palpitations this morning, but blood

pressure remains normal.



PHYSICAL EXAMINATION:

GENERAL:  An elderly lady sitting in chair.

VITAL SIGNS:  Blood pressure 135/77, heart rate 68, respiratory rate 20,

and temperature 97.8.

HEENT:  Normocephalic, atraumatic.  Positive pallor.

NECK:  Supple.  No JVD.

LUNGS:  Bilateral equal air entry, bilateral equal expansion.

CARDIAC:  S1 and S2.  Regular rate and rhythm.  No murmur, no rub.

ABDOMEN:  Obese, distended, soft, and nontender.  Bowel sounds present.

EXTREMITIES:  _____.

INPUT AND OUTPUT:  Not charted.



LABORATORY DATA:  WBC 9, hemoglobin 11.7, hematocrit 37, and platelets 224.



No chemistry today.



ASSESSMENT:

1.  Uncontrolled hypertension, blood pressure control is now better.

2.  Status post bilateral renal artery stents.

3.  Acute kidney injury superimposed on chronic kidney disease stage III.

4.  Hyperlipidemia.

5.  Episodic hypertension/palpitations.



PLAN:

1.  Continue current medications.  Blood pressure seems to be better. 

Continue Aldactone 25 b.i.d., hydralazine 50 b.i.d., Calan 240, Catapres

0.2 at bedtime, Cozaar 100, and Lopressor 25 b.i.d.

2.  Check labs in a.m.

3.  Discharge planning.







__________________________________________

Katiuska Alfred MD





DD:  12/06/2018 20:49:37

DT:  12/06/2018 22:11:55

Job # [id number]

## 2018-12-07 NOTE — PN
DATE:  12/07/2018



REASON FOR CONSULTATION:  Followup cardiac evaluation, palpitation,

hypokalemia, history of hypertension, history of renal artery stenosis,

status post stent, uncontrolled hypertension, palpitation.



This note is in addition to dictated by nurse practitioner, Jennifer Moreno.  The patient's blood pressure is fairly stable.  Heart rate is

57 to 60, blood pressure is 132/59 in last 24 hours.  No complaint.  No

chest pain.  No shortness of breath.  No palpitation.



RECOMMENDATIONS:  Continue aggressive medical treatment.  Continue current

medications.  Discussed with the patient's daughter.  Discussed with the

patient.  The patient's stress test is normal.  Continue clonidine. 

Continue Cozaar.  Continue verapamil.  Continue hydralazine.  We will

follow with you.  Possible discharge today.



Thank you Dr. Ram for providing us the opportunity in taking care of

the patient, Lynda Abdul.  We will follow with you.





__________________________________________

Mary Mcgraw MD



DD:  12/07/2018 17:12:36

DT:  12/07/2018 20:52:07

Job # [id number]

## 2019-06-02 NOTE — PN
DATE:  12/07/2018



SUBJECTIVE:   The patient is seen, sitting in bed.  She is awake.  She is

alert.  She is comfortable.  She reports that she had a brief episode of

palpitation this morning.



PHYSICAL EXAMINATION:

GENERAL:  Elderly lady, sitting in chair.

VITAL SIGNS:  Blood pressure 132/59, heart rate 57, respiratory rate 20,

temperature 97.7.

HEENT:  Normocephalic, atraumatic, positive pallor.

NECK:  Supple.  No JVD.

LUNGS:  Bilateral equal air entry.  No rales, no rhonchi.

CARDIAC:  S1, S2.  Regular rate and rhythm.  No murmur, no rub.

ABDOMEN:  Obese, distended, soft, nontender.  Bowel sounds present.

EXTREMITIES:  No lower extremity edema.

INTAKE AND OUTPUT:  Not charted.



LABORATORY DATA:  WBC 8.2, hemoglobin 11, hematocrit 35, platelets 212. 

Sodium 135, potassium 5, chloride 106, CO2 of 23, BUN 39, creatinine 1.4,

glucose 99, calcium 9.8, phosphorus 3.5, magnesium 2.1.  Urinalysis clear.



MEDICATIONS:  Aldactone 25 b.i.d., Apresoline 50 b.i.d., Calan 240,

Catapres 0.2 at bedtime, losartan 100, Ecotrin 81, Lopressor 25 b.i.d.,

MiraLax 17 g b.i.d., Pepcid.



ASSESSMENT:

1.  Severe uncontrolled hypertension, now much better controlled.

2.  Bilateral renal artery stenosis, status post bilateral renal artery

stents.

3.  Hyperlipidemia.

4.  Acute kidney injury, hemodynamically mediated.



PLAN:

1.  Continue current antihypertensive regimen.

2.  Expect renal function to stabilize.

3.  No objection to discharge.





__________________________________________

Katiuska Alfred MD



DD:  12/07/2018 21:14:03

DT:  12/07/2018 21:15:47

Job # [id number] Discharge Note    Data:  Bandar Del Toro 

## 2020-12-28 ENCOUNTER — PREPPED CHART (OUTPATIENT)
Dept: URBAN - METROPOLITAN AREA CLINIC 21 | Facility: CLINIC | Age: 85
End: 2020-12-28

## 2020-12-28 PROBLEM — Z96.1 PSEUDOPHAKIA: Noted: 2020-12-28

## 2020-12-28 PROBLEM — H43.393 VITREOUS FLOATERS: Noted: 2020-12-28

## 2021-12-21 ENCOUNTER — ESTABLISHED COMPREHENSIVE EXAM (OUTPATIENT)
Dept: URBAN - METROPOLITAN AREA CLINIC 21 | Facility: CLINIC | Age: 86
End: 2021-12-21

## 2021-12-21 DIAGNOSIS — Z96.1: ICD-10-CM

## 2021-12-21 DIAGNOSIS — H43.393: ICD-10-CM

## 2021-12-21 DIAGNOSIS — H01.026: ICD-10-CM

## 2021-12-21 PROCEDURE — 92014 COMPRE OPH EXAM EST PT 1/>: CPT

## 2021-12-21 ASSESSMENT — VISUAL ACUITY
OD_CC: J1/+
OS_CC: 20/50-1
OD_CC: 20/25-2

## 2021-12-21 ASSESSMENT — TONOMETRY
OS_IOP_MMHG: 18
OD_IOP_MMHG: 17

## 2022-09-02 ENCOUNTER — EMERGENCY VISIT (OUTPATIENT)
Dept: URBAN - METROPOLITAN AREA CLINIC 21 | Facility: CLINIC | Age: 87
End: 2022-09-02

## 2022-09-02 DIAGNOSIS — H35.62: ICD-10-CM

## 2022-09-02 PROCEDURE — 92250 FUNDUS PHOTOGRAPHY W/I&R: CPT

## 2022-09-02 PROCEDURE — 92014 COMPRE OPH EXAM EST PT 1/>: CPT

## 2022-09-02 ASSESSMENT — VISUAL ACUITY
OS_SC: 20/40-2
OU_SC: J2
OD_SC: 20/50-1
OD_PH: 20/30-2

## 2022-09-02 ASSESSMENT — TONOMETRY
OD_IOP_MMHG: 10
OS_IOP_MMHG: 10

## 2022-09-03 ENCOUNTER — NEW REFERRAL (OUTPATIENT)
Dept: URBAN - METROPOLITAN AREA CLINIC 51 | Facility: CLINIC | Age: 87
End: 2022-09-03

## 2022-09-03 DIAGNOSIS — H35.372: ICD-10-CM

## 2022-09-03 DIAGNOSIS — Z96.1: ICD-10-CM

## 2022-09-03 DIAGNOSIS — H35.62: ICD-10-CM

## 2022-09-03 PROCEDURE — 92250 FUNDUS PHOTOGRAPHY W/I&R: CPT

## 2022-09-03 PROCEDURE — 99204 OFFICE O/P NEW MOD 45 MIN: CPT

## 2022-09-03 PROCEDURE — 92235 FLUORESCEIN ANGRPH MLTIFRAME: CPT

## 2022-09-03 PROCEDURE — 92134 CPTRZ OPH DX IMG PST SGM RTA: CPT

## 2022-09-03 ASSESSMENT — VISUAL ACUITY
OS_SC: 20/40-1
OD_SC: 20/40

## 2022-09-03 ASSESSMENT — TONOMETRY
OS_IOP_MMHG: 10
OD_IOP_MMHG: 14

## 2022-09-26 ENCOUNTER — FOLLOW UP (OUTPATIENT)
Dept: URBAN - METROPOLITAN AREA CLINIC 14 | Facility: CLINIC | Age: 87
End: 2022-09-26

## 2022-09-26 DIAGNOSIS — H35.372: ICD-10-CM

## 2022-09-26 DIAGNOSIS — Z96.1: ICD-10-CM

## 2022-09-26 DIAGNOSIS — H35.62: ICD-10-CM

## 2022-09-26 PROCEDURE — 92134 CPTRZ OPH DX IMG PST SGM RTA: CPT

## 2022-09-26 PROCEDURE — 92250 FUNDUS PHOTOGRAPHY W/I&R: CPT

## 2022-09-26 PROCEDURE — 92014 COMPRE OPH EXAM EST PT 1/>: CPT

## 2022-09-26 PROCEDURE — 92201 OPSCPY EXTND RTA DRAW UNI/BI: CPT

## 2022-09-26 ASSESSMENT — TONOMETRY
OD_IOP_MMHG: 17
OS_IOP_MMHG: 15

## 2022-09-26 ASSESSMENT — VISUAL ACUITY
OD_PH: 20/25-1
OD_SC: 20/50-4
OS_SC: 20/40-1
OS_PH: 20/30+2

## 2022-10-31 ENCOUNTER — FOLLOW UP (OUTPATIENT)
Dept: URBAN - METROPOLITAN AREA CLINIC 14 | Facility: CLINIC | Age: 87
End: 2022-10-31

## 2022-10-31 DIAGNOSIS — H35.62: ICD-10-CM

## 2022-10-31 DIAGNOSIS — H43.812: ICD-10-CM

## 2022-10-31 DIAGNOSIS — H35.372: ICD-10-CM

## 2022-10-31 PROCEDURE — 92201 OPSCPY EXTND RTA DRAW UNI/BI: CPT

## 2022-10-31 PROCEDURE — 92250 FUNDUS PHOTOGRAPHY W/I&R: CPT

## 2022-10-31 PROCEDURE — 92134 CPTRZ OPH DX IMG PST SGM RTA: CPT

## 2022-10-31 PROCEDURE — 92014 COMPRE OPH EXAM EST PT 1/>: CPT

## 2022-10-31 ASSESSMENT — TONOMETRY
OS_IOP_MMHG: 15
OD_IOP_MMHG: 15

## 2022-10-31 ASSESSMENT — VISUAL ACUITY
OS_SC: 20/40-1
OS_PH: 20/30
OD_SC: 20/50+1
OD_PH: 20/25-1

## 2022-12-12 ENCOUNTER — FOLLOW UP (OUTPATIENT)
Dept: URBAN - METROPOLITAN AREA CLINIC 14 | Facility: CLINIC | Age: 87
End: 2022-12-12

## 2022-12-12 DIAGNOSIS — H43.812: ICD-10-CM

## 2022-12-12 DIAGNOSIS — H35.62: ICD-10-CM

## 2022-12-12 DIAGNOSIS — H35.372: ICD-10-CM

## 2022-12-12 PROCEDURE — 92014 COMPRE OPH EXAM EST PT 1/>: CPT

## 2022-12-12 PROCEDURE — 92250 FUNDUS PHOTOGRAPHY W/I&R: CPT

## 2022-12-12 PROCEDURE — 92201 OPSCPY EXTND RTA DRAW UNI/BI: CPT

## 2022-12-12 PROCEDURE — 92134 CPTRZ OPH DX IMG PST SGM RTA: CPT

## 2022-12-12 ASSESSMENT — TONOMETRY
OD_IOP_MMHG: 12
OS_IOP_MMHG: 12

## 2022-12-12 ASSESSMENT — VISUAL ACUITY
OD_PH: 20/30-1
OD_SC: 20/50-1
OS_SC: 20/50-3
OS_PH: 20/40

## 2023-02-06 ENCOUNTER — FOLLOW UP (OUTPATIENT)
Dept: URBAN - METROPOLITAN AREA CLINIC 14 | Facility: CLINIC | Age: 88
End: 2023-02-06

## 2023-02-06 DIAGNOSIS — H43.812: ICD-10-CM

## 2023-02-06 DIAGNOSIS — H35.62: ICD-10-CM

## 2023-02-06 DIAGNOSIS — H35.3231: ICD-10-CM

## 2023-02-06 DIAGNOSIS — H35.372: ICD-10-CM

## 2023-02-06 PROCEDURE — 92014 COMPRE OPH EXAM EST PT 1/>: CPT

## 2023-02-06 PROCEDURE — 92134 CPTRZ OPH DX IMG PST SGM RTA: CPT

## 2023-02-06 PROCEDURE — 92202 OPSCPY EXTND ON/MAC DRAW: CPT

## 2023-02-06 ASSESSMENT — TONOMETRY
OD_IOP_MMHG: 14
OS_IOP_MMHG: 12

## 2023-02-06 ASSESSMENT — VISUAL ACUITY
OS_SC: 20/40-1
OD_PH: 20/40-1
OD_SC: 20/50-2

## 2023-06-12 ENCOUNTER — FOLLOW UP (OUTPATIENT)
Dept: URBAN - METROPOLITAN AREA CLINIC 14 | Facility: CLINIC | Age: 88
End: 2023-06-12

## 2023-06-12 DIAGNOSIS — H43.812: ICD-10-CM

## 2023-06-12 DIAGNOSIS — H35.372: ICD-10-CM

## 2023-06-12 DIAGNOSIS — H35.3231: ICD-10-CM

## 2023-06-12 DIAGNOSIS — H35.62: ICD-10-CM

## 2023-06-12 PROCEDURE — 92134 CPTRZ OPH DX IMG PST SGM RTA: CPT

## 2023-06-12 PROCEDURE — 92201 OPSCPY EXTND RTA DRAW UNI/BI: CPT

## 2023-06-12 PROCEDURE — 92014 COMPRE OPH EXAM EST PT 1/>: CPT

## 2023-06-12 ASSESSMENT — VISUAL ACUITY
OD_PH: 20/30
OD_SC: 20/50-1
OS_SC: 20/40

## 2023-06-12 ASSESSMENT — TONOMETRY
OS_IOP_MMHG: 12
OD_IOP_MMHG: 13

## 2023-07-20 ENCOUNTER — ESTABLISHED COMPREHENSIVE EXAM (OUTPATIENT)
Dept: URBAN - METROPOLITAN AREA CLINIC 21 | Facility: CLINIC | Age: 88
End: 2023-07-20

## 2023-07-20 DIAGNOSIS — H52.7: ICD-10-CM

## 2023-07-20 DIAGNOSIS — Z96.1: ICD-10-CM

## 2023-07-20 DIAGNOSIS — H35.3231: ICD-10-CM

## 2023-07-20 DIAGNOSIS — H43.393: ICD-10-CM

## 2023-07-20 PROCEDURE — 92250 FUNDUS PHOTOGRAPHY W/I&R: CPT

## 2023-07-20 PROCEDURE — 92014 COMPRE OPH EXAM EST PT 1/>: CPT

## 2023-07-20 PROCEDURE — 92015 DETERMINE REFRACTIVE STATE: CPT

## 2023-07-20 ASSESSMENT — VISUAL ACUITY
OS_CC: 20/40
OD_CC: 20/25

## 2023-07-20 ASSESSMENT — TONOMETRY
OS_IOP_MMHG: 13
OD_IOP_MMHG: 13

## 2024-01-22 ENCOUNTER — FOLLOW UP (OUTPATIENT)
Dept: URBAN - METROPOLITAN AREA CLINIC 14 | Facility: CLINIC | Age: 89
End: 2024-01-22

## 2024-01-22 DIAGNOSIS — H35.372: ICD-10-CM

## 2024-01-22 DIAGNOSIS — H35.62: ICD-10-CM

## 2024-01-22 DIAGNOSIS — H35.3132: ICD-10-CM

## 2024-01-22 DIAGNOSIS — H43.812: ICD-10-CM

## 2024-01-22 DIAGNOSIS — H35.3231: ICD-10-CM

## 2024-01-22 PROCEDURE — 92014 COMPRE OPH EXAM EST PT 1/>: CPT

## 2024-01-22 PROCEDURE — 92134 CPTRZ OPH DX IMG PST SGM RTA: CPT

## 2024-01-22 PROCEDURE — 92202 OPSCPY EXTND ON/MAC DRAW: CPT

## 2024-01-22 ASSESSMENT — VISUAL ACUITY
OD_PH: 20/40+1
OS_PH: 20/40-1
OS_SC: 20/50
OD_SC: 20/50-3

## 2024-01-22 ASSESSMENT — TONOMETRY
OS_IOP_MMHG: 16
OD_IOP_MMHG: 16

## 2024-02-26 ENCOUNTER — EMERGENCY VISIT (OUTPATIENT)
Dept: URBAN - METROPOLITAN AREA CLINIC 21 | Facility: CLINIC | Age: 89
End: 2024-02-26

## 2024-02-26 DIAGNOSIS — H16.143: ICD-10-CM

## 2024-02-26 PROCEDURE — 92012 INTRM OPH EXAM EST PATIENT: CPT

## 2024-02-26 ASSESSMENT — TONOMETRY
OS_IOP_MMHG: 10
OD_IOP_MMHG: 9

## 2024-02-26 ASSESSMENT — VISUAL ACUITY
OD_CC: 20/40+2
OS_PH: 20/50
OS_CC: 20/60+1
OU_CC: 20/40
OU_CC: J2

## 2024-03-18 ENCOUNTER — FOLLOW UP (OUTPATIENT)
Dept: URBAN - METROPOLITAN AREA CLINIC 21 | Facility: CLINIC | Age: 89
End: 2024-03-18

## 2024-03-18 DIAGNOSIS — H16.143: ICD-10-CM

## 2024-03-18 PROCEDURE — 92012 INTRM OPH EXAM EST PATIENT: CPT

## 2024-03-18 ASSESSMENT — TONOMETRY
OD_IOP_MMHG: 10
OS_IOP_MMHG: 9

## 2024-03-18 ASSESSMENT — VISUAL ACUITY
OU_SC: J3
OS_SC: 20/40
OU_SC: 20/40
OD_PH: 20/40
OD_SC: 20/80

## 2024-07-22 ENCOUNTER — ESTABLISHED COMPREHENSIVE EXAM (OUTPATIENT)
Dept: URBAN - METROPOLITAN AREA CLINIC 21 | Facility: CLINIC | Age: 89
End: 2024-07-22

## 2024-07-22 DIAGNOSIS — H43.812: ICD-10-CM

## 2024-07-22 DIAGNOSIS — H16.143: ICD-10-CM

## 2024-07-22 DIAGNOSIS — Z96.1: ICD-10-CM

## 2024-07-22 DIAGNOSIS — H35.62: ICD-10-CM

## 2024-07-22 PROCEDURE — 92014 COMPRE OPH EXAM EST PT 1/>: CPT

## 2024-07-22 ASSESSMENT — TONOMETRY
OS_IOP_MMHG: 10
OD_IOP_MMHG: 10

## 2024-07-22 ASSESSMENT — VISUAL ACUITY
OD_CC: 20/40
OS_CC: 20/50-
OD_CC: J2
OS_CC: J5-2

## 2024-07-29 ENCOUNTER — FOLLOW UP (OUTPATIENT)
Dept: URBAN - METROPOLITAN AREA CLINIC 14 | Facility: CLINIC | Age: 89
End: 2024-07-29

## 2024-07-29 DIAGNOSIS — H35.372: ICD-10-CM

## 2024-07-29 DIAGNOSIS — H43.812: ICD-10-CM

## 2024-07-29 DIAGNOSIS — H35.3231: ICD-10-CM

## 2024-07-29 DIAGNOSIS — H35.62: ICD-10-CM

## 2024-07-29 DIAGNOSIS — H35.3132: ICD-10-CM

## 2024-07-29 PROCEDURE — 92134 CPTRZ OPH DX IMG PST SGM RTA: CPT

## 2024-07-29 PROCEDURE — 92202 OPSCPY EXTND ON/MAC DRAW: CPT

## 2024-07-29 PROCEDURE — 92014 COMPRE OPH EXAM EST PT 1/>: CPT

## 2024-07-29 ASSESSMENT — VISUAL ACUITY
OD_SC: 20/50-3
OD_PH: 20/50
OS_SC: 20/50

## 2024-07-29 ASSESSMENT — TONOMETRY
OD_IOP_MMHG: 14
OS_IOP_MMHG: 14

## 2024-09-23 ENCOUNTER — FOLLOW UP (OUTPATIENT)
Dept: URBAN - METROPOLITAN AREA CLINIC 14 | Facility: CLINIC | Age: 89
End: 2024-09-23

## 2024-09-23 DIAGNOSIS — H35.62: ICD-10-CM

## 2024-09-23 DIAGNOSIS — H35.052: ICD-10-CM

## 2024-09-23 DIAGNOSIS — H35.3231: ICD-10-CM

## 2024-09-23 DIAGNOSIS — H43.812: ICD-10-CM

## 2024-09-23 DIAGNOSIS — H35.3132: ICD-10-CM

## 2024-09-23 DIAGNOSIS — H35.372: ICD-10-CM

## 2024-09-23 PROCEDURE — 92134 CPTRZ OPH DX IMG PST SGM RTA: CPT

## 2024-09-23 PROCEDURE — 92202 OPSCPY EXTND ON/MAC DRAW: CPT

## 2024-09-23 PROCEDURE — 92014 COMPRE OPH EXAM EST PT 1/>: CPT

## 2024-09-23 ASSESSMENT — TONOMETRY
OS_IOP_MMHG: 11
OD_IOP_MMHG: 13

## 2024-09-23 ASSESSMENT — VISUAL ACUITY
OD_SC: 20/60-2
OS_PH: 20/40
OS_SC: 20/50-3
OD_PH: 20/30-1

## 2024-12-09 ENCOUNTER — FOLLOW UP (OUTPATIENT)
Dept: URBAN - METROPOLITAN AREA CLINIC 14 | Facility: CLINIC | Age: 89
End: 2024-12-09

## 2024-12-09 DIAGNOSIS — H35.3132: ICD-10-CM

## 2024-12-09 DIAGNOSIS — H35.372: ICD-10-CM

## 2024-12-09 DIAGNOSIS — H35.62: ICD-10-CM

## 2024-12-09 DIAGNOSIS — H35.052: ICD-10-CM

## 2024-12-09 DIAGNOSIS — H43.812: ICD-10-CM

## 2024-12-09 PROCEDURE — 92014 COMPRE OPH EXAM EST PT 1/>: CPT

## 2024-12-09 PROCEDURE — 92202 OPSCPY EXTND ON/MAC DRAW: CPT

## 2024-12-09 PROCEDURE — 92134 CPTRZ OPH DX IMG PST SGM RTA: CPT

## 2024-12-09 ASSESSMENT — VISUAL ACUITY
OS_PH: 20/40
OS_SC: 20/50-1
OD_SC: 20/50-1
OD_PH: 20/30

## 2024-12-09 ASSESSMENT — TONOMETRY
OS_IOP_MMHG: 12
OD_IOP_MMHG: 13

## 2025-03-15 ENCOUNTER — PROBLEM (OUTPATIENT)
Dept: URBAN - METROPOLITAN AREA CLINIC 51 | Age: OVER 89
End: 2025-03-15

## 2025-03-15 DIAGNOSIS — H35.052: ICD-10-CM

## 2025-03-15 DIAGNOSIS — H35.62: ICD-10-CM

## 2025-03-15 DIAGNOSIS — H43.812: ICD-10-CM

## 2025-03-15 DIAGNOSIS — H35.372: ICD-10-CM

## 2025-03-15 DIAGNOSIS — H35.3132: ICD-10-CM

## 2025-03-15 PROCEDURE — 92014 COMPRE OPH EXAM EST PT 1/>: CPT

## 2025-03-15 PROCEDURE — 92202 OPSCPY EXTND ON/MAC DRAW: CPT

## 2025-03-15 PROCEDURE — 92250 FUNDUS PHOTOGRAPHY W/I&R: CPT

## 2025-03-15 PROCEDURE — 92134 CPTRZ OPH DX IMG PST SGM RTA: CPT | Mod: NC

## 2025-03-15 ASSESSMENT — VISUAL ACUITY
OD_SC: 20/50-2
OD_PH: 20/40
OS_SC: 20/40

## 2025-03-15 ASSESSMENT — TONOMETRY
OS_IOP_MMHG: 17
OD_IOP_MMHG: 15

## 2025-03-28 ENCOUNTER — FOLLOW UP (OUTPATIENT)
Dept: URBAN - METROPOLITAN AREA CLINIC 51 | Age: OVER 89
End: 2025-03-28

## 2025-03-28 DIAGNOSIS — H35.3132: ICD-10-CM

## 2025-03-28 DIAGNOSIS — H43.812: ICD-10-CM

## 2025-03-28 DIAGNOSIS — H35.372: ICD-10-CM

## 2025-03-28 DIAGNOSIS — H35.62: ICD-10-CM

## 2025-03-28 PROCEDURE — 92134 CPTRZ OPH DX IMG PST SGM RTA: CPT

## 2025-03-28 PROCEDURE — 92202 OPSCPY EXTND ON/MAC DRAW: CPT

## 2025-03-28 PROCEDURE — 92014 COMPRE OPH EXAM EST PT 1/>: CPT

## 2025-03-28 ASSESSMENT — TONOMETRY
OS_IOP_MMHG: 11
OD_IOP_MMHG: 14

## 2025-03-28 ASSESSMENT — VISUAL ACUITY
OD_PH: 20/40
OD_SC: 20/60-1
OS_SC: 20/50-1